# Patient Record
Sex: FEMALE | Race: WHITE | NOT HISPANIC OR LATINO | Employment: OTHER | URBAN - METROPOLITAN AREA
[De-identification: names, ages, dates, MRNs, and addresses within clinical notes are randomized per-mention and may not be internally consistent; named-entity substitution may affect disease eponyms.]

---

## 2017-01-09 ENCOUNTER — AMBULATORY - HEALTHEAST (OUTPATIENT)
Dept: ENDOCRINOLOGY | Facility: CLINIC | Age: 67
End: 2017-01-09

## 2017-01-09 DIAGNOSIS — M81.0 OSTEOPOROSIS: ICD-10-CM

## 2017-03-02 ENCOUNTER — COMMUNICATION - HEALTHEAST (OUTPATIENT)
Dept: INTERNAL MEDICINE | Facility: CLINIC | Age: 67
End: 2017-03-02

## 2017-03-02 DIAGNOSIS — I10 ESSENTIAL HYPERTENSION WITH GOAL BLOOD PRESSURE LESS THAN 140/90: ICD-10-CM

## 2017-03-02 DIAGNOSIS — E78.5 HYPERLIPIDEMIA: ICD-10-CM

## 2017-07-11 ENCOUNTER — AMBULATORY - HEALTHEAST (OUTPATIENT)
Dept: ENDOCRINOLOGY | Facility: CLINIC | Age: 67
End: 2017-07-11

## 2017-07-11 DIAGNOSIS — M81.0 OSTEOPOROSIS: ICD-10-CM

## 2017-09-27 ENCOUNTER — COMMUNICATION - HEALTHEAST (OUTPATIENT)
Dept: INTERNAL MEDICINE | Facility: CLINIC | Age: 67
End: 2017-09-27

## 2017-09-27 DIAGNOSIS — I10 ESSENTIAL HYPERTENSION WITH GOAL BLOOD PRESSURE LESS THAN 140/90: ICD-10-CM

## 2017-09-28 ENCOUNTER — OFFICE VISIT - HEALTHEAST (OUTPATIENT)
Dept: INTERNAL MEDICINE | Facility: CLINIC | Age: 67
End: 2017-09-28

## 2017-09-28 DIAGNOSIS — E78.5 HYPERLIPIDEMIA: ICD-10-CM

## 2017-09-28 DIAGNOSIS — M53.3 SI (SACROILIAC) PAIN: ICD-10-CM

## 2017-09-28 DIAGNOSIS — M25.511 RIGHT SHOULDER PAIN: ICD-10-CM

## 2017-09-28 DIAGNOSIS — I10 ESSENTIAL HYPERTENSION WITH GOAL BLOOD PRESSURE LESS THAN 140/90: ICD-10-CM

## 2017-09-28 ASSESSMENT — MIFFLIN-ST. JEOR: SCORE: 1008.5

## 2017-10-03 ENCOUNTER — OFFICE VISIT - HEALTHEAST (OUTPATIENT)
Dept: PHYSICAL THERAPY | Facility: REHABILITATION | Age: 67
End: 2017-10-03

## 2017-10-03 DIAGNOSIS — M54.2 CERVICALGIA: ICD-10-CM

## 2017-10-03 DIAGNOSIS — M62.81 MUSCLE WEAKNESS (GENERALIZED): ICD-10-CM

## 2017-10-03 DIAGNOSIS — R29.3 POOR POSTURE: ICD-10-CM

## 2017-10-03 DIAGNOSIS — M54.50 ACUTE RIGHT-SIDED LOW BACK PAIN WITHOUT SCIATICA: ICD-10-CM

## 2017-10-05 ENCOUNTER — OFFICE VISIT - HEALTHEAST (OUTPATIENT)
Dept: PHYSICAL THERAPY | Facility: REHABILITATION | Age: 67
End: 2017-10-05

## 2017-10-05 DIAGNOSIS — R29.3 POOR POSTURE: ICD-10-CM

## 2017-10-05 DIAGNOSIS — M62.81 MUSCLE WEAKNESS (GENERALIZED): ICD-10-CM

## 2017-10-05 DIAGNOSIS — M54.2 CERVICALGIA: ICD-10-CM

## 2017-10-05 DIAGNOSIS — M54.50 ACUTE RIGHT-SIDED LOW BACK PAIN WITHOUT SCIATICA: ICD-10-CM

## 2017-10-10 ENCOUNTER — OFFICE VISIT - HEALTHEAST (OUTPATIENT)
Dept: INTERNAL MEDICINE | Facility: CLINIC | Age: 67
End: 2017-10-10

## 2017-10-10 DIAGNOSIS — I10 ESSENTIAL HYPERTENSION: ICD-10-CM

## 2017-10-10 DIAGNOSIS — Z00.00 HEALTH CARE MAINTENANCE: ICD-10-CM

## 2017-10-10 DIAGNOSIS — M81.0 OSTEOPOROSIS: ICD-10-CM

## 2017-10-10 DIAGNOSIS — M54.2 NECK PAIN: ICD-10-CM

## 2017-10-10 DIAGNOSIS — E78.00 HYPERCHOLESTEREMIA: ICD-10-CM

## 2017-10-10 LAB
CHOLEST SERPL-MCNC: 186 MG/DL
FASTING STATUS PATIENT QL REPORTED: YES
HDLC SERPL-MCNC: 64 MG/DL
LDLC SERPL CALC-MCNC: 76 MG/DL
TRIGL SERPL-MCNC: 231 MG/DL

## 2017-10-11 ENCOUNTER — COMMUNICATION - HEALTHEAST (OUTPATIENT)
Dept: INTERNAL MEDICINE | Facility: CLINIC | Age: 67
End: 2017-10-11

## 2017-11-07 ENCOUNTER — HOSPITAL ENCOUNTER (OUTPATIENT)
Dept: MAMMOGRAPHY | Facility: HOSPITAL | Age: 67
Discharge: HOME | End: 2017-11-07
Attending: INTERNAL MEDICINE

## 2017-11-07 DIAGNOSIS — Z12.31 VISIT FOR SCREENING MAMMOGRAM: ICD-10-CM

## 2017-11-28 ENCOUNTER — COMMUNICATION - HEALTHEAST (OUTPATIENT)
Dept: INTERNAL MEDICINE | Facility: CLINIC | Age: 67
End: 2017-11-28

## 2017-12-05 ENCOUNTER — COMMUNICATION - HEALTHEAST (OUTPATIENT)
Dept: INTERNAL MEDICINE | Facility: CLINIC | Age: 67
End: 2017-12-05

## 2018-01-16 ENCOUNTER — AMBULATORY - HEALTHEAST (OUTPATIENT)
Dept: ENDOCRINOLOGY | Facility: CLINIC | Age: 68
End: 2018-01-16

## 2018-01-16 DIAGNOSIS — M81.0 OSTEOPOROSIS: ICD-10-CM

## 2018-07-20 ENCOUNTER — AMBULATORY - HEALTHEAST (OUTPATIENT)
Dept: ENDOCRINOLOGY | Facility: CLINIC | Age: 68
End: 2018-07-20

## 2018-09-12 ENCOUNTER — COMMUNICATION - HEALTHEAST (OUTPATIENT)
Dept: INTERNAL MEDICINE | Facility: CLINIC | Age: 68
End: 2018-09-12

## 2018-09-12 DIAGNOSIS — I10 ESSENTIAL HYPERTENSION WITH GOAL BLOOD PRESSURE LESS THAN 140/90: ICD-10-CM

## 2018-10-17 ENCOUNTER — AMBULATORY - HEALTHEAST (OUTPATIENT)
Dept: LAB | Facility: CLINIC | Age: 68
End: 2018-10-17

## 2018-10-17 DIAGNOSIS — Z12.11 COLON CANCER SCREENING: ICD-10-CM

## 2018-10-17 LAB
HEMOCCULT SP1 STL QL: NEGATIVE
HEMOCCULT SP2 STL QL: NEGATIVE
HEMOCCULT SP3 STL QL: NEGATIVE

## 2018-10-18 ENCOUNTER — COMMUNICATION - HEALTHEAST (OUTPATIENT)
Dept: INTERNAL MEDICINE | Facility: CLINIC | Age: 68
End: 2018-10-18

## 2018-10-23 ENCOUNTER — RECORDS - HEALTHEAST (OUTPATIENT)
Dept: ADMINISTRATIVE | Facility: OTHER | Age: 68
End: 2018-10-23

## 2018-10-29 ENCOUNTER — OFFICE VISIT - HEALTHEAST (OUTPATIENT)
Dept: FAMILY MEDICINE | Facility: CLINIC | Age: 68
End: 2018-10-29

## 2018-10-29 ENCOUNTER — COMMUNICATION - HEALTHEAST (OUTPATIENT)
Dept: FAMILY MEDICINE | Facility: CLINIC | Age: 68
End: 2018-10-29

## 2018-10-29 DIAGNOSIS — I10 ESSENTIAL HYPERTENSION: ICD-10-CM

## 2018-10-29 DIAGNOSIS — M81.0 OSTEOPOROSIS WITHOUT CURRENT PATHOLOGICAL FRACTURE, UNSPECIFIED OSTEOPOROSIS TYPE: ICD-10-CM

## 2018-10-29 DIAGNOSIS — Z00.00 ROUTINE GENERAL MEDICAL EXAMINATION AT A HEALTH CARE FACILITY: ICD-10-CM

## 2018-10-29 DIAGNOSIS — R73.01 IMPAIRED FASTING GLUCOSE: ICD-10-CM

## 2018-10-29 DIAGNOSIS — J30.89 NON-SEASONAL ALLERGIC RHINITIS, UNSPECIFIED TRIGGER: ICD-10-CM

## 2018-10-29 DIAGNOSIS — F43.22 ADJUSTMENT DISORDER WITH ANXIOUS MOOD: ICD-10-CM

## 2018-10-29 DIAGNOSIS — E78.00 HYPERCHOLESTEREMIA: ICD-10-CM

## 2018-10-29 LAB
ANION GAP SERPL CALCULATED.3IONS-SCNC: 13 MMOL/L (ref 5–18)
BUN SERPL-MCNC: 13 MG/DL (ref 8–22)
CALCIUM SERPL-MCNC: 9.7 MG/DL (ref 8.5–10.5)
CHLORIDE BLD-SCNC: 104 MMOL/L (ref 98–107)
CHOLEST SERPL-MCNC: 206 MG/DL
CO2 SERPL-SCNC: 22 MMOL/L (ref 22–31)
CREAT SERPL-MCNC: 0.78 MG/DL (ref 0.6–1.1)
ERYTHROCYTE [DISTWIDTH] IN BLOOD BY AUTOMATED COUNT: 9.8 % (ref 11–14.5)
FASTING STATUS PATIENT QL REPORTED: YES
GFR SERPL CREATININE-BSD FRML MDRD: >60 ML/MIN/1.73M2
GLUCOSE BLD-MCNC: 108 MG/DL (ref 70–125)
HCT VFR BLD AUTO: 37.5 % (ref 35–47)
HDLC SERPL-MCNC: 72 MG/DL
HGB BLD-MCNC: 12.2 G/DL (ref 12–16)
LDLC SERPL CALC-MCNC: 105 MG/DL
MCH RBC QN AUTO: 34.3 PG (ref 27–34)
MCHC RBC AUTO-ENTMCNC: 32.5 G/DL (ref 32–36)
MCV RBC AUTO: 105 FL (ref 80–100)
PLATELET # BLD AUTO: 200 THOU/UL (ref 140–440)
PMV BLD AUTO: 7.9 FL (ref 7–10)
POTASSIUM BLD-SCNC: 4 MMOL/L (ref 3.5–5)
RBC # BLD AUTO: 3.56 MILL/UL (ref 3.8–5.4)
SODIUM SERPL-SCNC: 139 MMOL/L (ref 136–145)
TRIGL SERPL-MCNC: 145 MG/DL
WBC: 8 THOU/UL (ref 4–11)

## 2018-10-29 ASSESSMENT — MIFFLIN-ST. JEOR: SCORE: 979.02

## 2018-12-04 ENCOUNTER — AMBULATORY - HEALTHEAST (OUTPATIENT)
Dept: NURSING | Facility: CLINIC | Age: 68
End: 2018-12-04

## 2018-12-05 ENCOUNTER — RECORDS - HEALTHEAST (OUTPATIENT)
Dept: BONE DENSITY | Facility: CLINIC | Age: 68
End: 2018-12-05

## 2018-12-05 ENCOUNTER — HOSPITAL ENCOUNTER (OUTPATIENT)
Dept: MAMMOGRAPHY | Facility: CLINIC | Age: 68
Discharge: HOME | End: 2018-12-05
Attending: INTERNAL MEDICINE

## 2018-12-05 DIAGNOSIS — M81.0 AGE-RELATED OSTEOPOROSIS WITHOUT CURRENT PATHOLOGICAL FRACTURE: ICD-10-CM

## 2018-12-05 DIAGNOSIS — Z12.31 VISIT FOR SCREENING MAMMOGRAM: ICD-10-CM

## 2018-12-06 ENCOUNTER — COMMUNICATION - HEALTHEAST (OUTPATIENT)
Dept: FAMILY MEDICINE | Facility: CLINIC | Age: 68
End: 2018-12-06

## 2018-12-06 ENCOUNTER — RECORDS - HEALTHEAST (OUTPATIENT)
Dept: ADMINISTRATIVE | Facility: OTHER | Age: 68
End: 2018-12-06

## 2018-12-19 ENCOUNTER — COMMUNICATION - HEALTHEAST (OUTPATIENT)
Dept: FAMILY MEDICINE | Facility: CLINIC | Age: 68
End: 2018-12-19

## 2018-12-19 DIAGNOSIS — M81.0 OSTEOPOROSIS WITHOUT CURRENT PATHOLOGICAL FRACTURE, UNSPECIFIED OSTEOPOROSIS TYPE: ICD-10-CM

## 2019-01-03 ENCOUNTER — COMMUNICATION - HEALTHEAST (OUTPATIENT)
Dept: INTERNAL MEDICINE | Facility: CLINIC | Age: 69
End: 2019-01-03

## 2019-01-10 ENCOUNTER — COMMUNICATION - HEALTHEAST (OUTPATIENT)
Dept: INTERNAL MEDICINE | Facility: CLINIC | Age: 69
End: 2019-01-10

## 2019-01-10 DIAGNOSIS — I10 ESSENTIAL HYPERTENSION WITH GOAL BLOOD PRESSURE LESS THAN 140/90: ICD-10-CM

## 2019-01-14 ENCOUNTER — COMMUNICATION - HEALTHEAST (OUTPATIENT)
Dept: INTERNAL MEDICINE | Facility: CLINIC | Age: 69
End: 2019-01-14

## 2019-01-14 DIAGNOSIS — I10 ESSENTIAL HYPERTENSION WITH GOAL BLOOD PRESSURE LESS THAN 140/90: ICD-10-CM

## 2019-01-17 ENCOUNTER — OFFICE VISIT - HEALTHEAST (OUTPATIENT)
Dept: INTERNAL MEDICINE | Facility: CLINIC | Age: 69
End: 2019-01-17

## 2019-01-17 DIAGNOSIS — M81.0 OSTEOPOROSIS WITHOUT CURRENT PATHOLOGICAL FRACTURE, UNSPECIFIED OSTEOPOROSIS TYPE: ICD-10-CM

## 2019-01-17 ASSESSMENT — MIFFLIN-ST. JEOR: SCORE: 995.18

## 2019-07-10 ENCOUNTER — RECORDS - HEALTHEAST (OUTPATIENT)
Dept: ADMINISTRATIVE | Facility: OTHER | Age: 69
End: 2019-07-10

## 2019-07-17 ENCOUNTER — COMMUNICATION - HEALTHEAST (OUTPATIENT)
Dept: INTERNAL MEDICINE | Facility: CLINIC | Age: 69
End: 2019-07-17

## 2019-07-18 ENCOUNTER — AMBULATORY - HEALTHEAST (OUTPATIENT)
Dept: NURSING | Facility: CLINIC | Age: 69
End: 2019-07-18

## 2019-12-12 ENCOUNTER — COMMUNICATION - HEALTHEAST (OUTPATIENT)
Dept: INTERNAL MEDICINE | Facility: CLINIC | Age: 69
End: 2019-12-12

## 2019-12-12 ENCOUNTER — COMMUNICATION - HEALTHEAST (OUTPATIENT)
Dept: SCHEDULING | Facility: CLINIC | Age: 69
End: 2019-12-12

## 2019-12-12 DIAGNOSIS — I10 ESSENTIAL HYPERTENSION WITH GOAL BLOOD PRESSURE LESS THAN 140/90: ICD-10-CM

## 2020-01-21 ENCOUNTER — OFFICE VISIT - HEALTHEAST (OUTPATIENT)
Dept: FAMILY MEDICINE | Facility: CLINIC | Age: 70
End: 2020-01-21

## 2020-01-21 DIAGNOSIS — Z00.00 ROUTINE GENERAL MEDICAL EXAMINATION AT A HEALTH CARE FACILITY: ICD-10-CM

## 2020-01-21 DIAGNOSIS — I10 ESSENTIAL HYPERTENSION: ICD-10-CM

## 2020-01-21 DIAGNOSIS — R73.01 IMPAIRED FASTING GLUCOSE: ICD-10-CM

## 2020-01-21 DIAGNOSIS — F43.22 ADJUSTMENT DISORDER WITH ANXIOUS MOOD: ICD-10-CM

## 2020-01-21 DIAGNOSIS — E78.00 HYPERCHOLESTEREMIA: ICD-10-CM

## 2020-01-21 DIAGNOSIS — Z12.11 COLON CANCER SCREENING: ICD-10-CM

## 2020-01-21 DIAGNOSIS — M81.0 OSTEOPOROSIS WITHOUT CURRENT PATHOLOGICAL FRACTURE, UNSPECIFIED OSTEOPOROSIS TYPE: ICD-10-CM

## 2020-01-21 LAB
ALBUMIN SERPL-MCNC: 4.1 G/DL (ref 3.5–5)
ALP SERPL-CCNC: 108 U/L (ref 45–120)
ALT SERPL W P-5'-P-CCNC: 35 U/L (ref 0–45)
ANION GAP SERPL CALCULATED.3IONS-SCNC: 10 MMOL/L (ref 5–18)
AST SERPL W P-5'-P-CCNC: 34 U/L (ref 0–40)
BILIRUB SERPL-MCNC: 0.8 MG/DL (ref 0–1)
BUN SERPL-MCNC: 13 MG/DL (ref 8–22)
CALCIUM SERPL-MCNC: 10 MG/DL (ref 8.5–10.5)
CHLORIDE BLD-SCNC: 101 MMOL/L (ref 98–107)
CHOLEST SERPL-MCNC: 210 MG/DL
CO2 SERPL-SCNC: 27 MMOL/L (ref 22–31)
CREAT SERPL-MCNC: 0.87 MG/DL (ref 0.6–1.1)
ERYTHROCYTE [DISTWIDTH] IN BLOOD BY AUTOMATED COUNT: 10.1 % (ref 11–14.5)
FASTING STATUS PATIENT QL REPORTED: YES
GFR SERPL CREATININE-BSD FRML MDRD: >60 ML/MIN/1.73M2
GLUCOSE BLD-MCNC: 139 MG/DL (ref 70–125)
HCT VFR BLD AUTO: 38.3 % (ref 35–47)
HDLC SERPL-MCNC: 85 MG/DL
HGB BLD-MCNC: 12.4 G/DL (ref 12–16)
LDLC SERPL CALC-MCNC: 91 MG/DL
MCH RBC QN AUTO: 34.1 PG (ref 27–34)
MCHC RBC AUTO-ENTMCNC: 32.3 G/DL (ref 32–36)
MCV RBC AUTO: 106 FL (ref 80–100)
PLATELET # BLD AUTO: 215 THOU/UL (ref 140–440)
PMV BLD AUTO: 7.8 FL (ref 7–10)
POTASSIUM BLD-SCNC: 4.4 MMOL/L (ref 3.5–5)
PROT SERPL-MCNC: 7.6 G/DL (ref 6–8)
RBC # BLD AUTO: 3.62 MILL/UL (ref 3.8–5.4)
SODIUM SERPL-SCNC: 138 MMOL/L (ref 136–145)
TRIGL SERPL-MCNC: 169 MG/DL
WBC: 6.7 THOU/UL (ref 4–11)

## 2020-01-21 ASSESSMENT — MIFFLIN-ST. JEOR: SCORE: 970.4

## 2020-01-22 ENCOUNTER — COMMUNICATION - HEALTHEAST (OUTPATIENT)
Dept: FAMILY MEDICINE | Facility: CLINIC | Age: 70
End: 2020-01-22

## 2020-01-22 LAB — HBA1C MFR BLD: 5.7 % (ref 3.5–6)

## 2020-01-23 ENCOUNTER — COMMUNICATION - HEALTHEAST (OUTPATIENT)
Dept: INTERNAL MEDICINE | Facility: CLINIC | Age: 70
End: 2020-01-23

## 2020-01-28 ENCOUNTER — AMBULATORY - HEALTHEAST (OUTPATIENT)
Dept: NURSING | Facility: CLINIC | Age: 70
End: 2020-01-28

## 2020-02-03 ENCOUNTER — RECORDS - HEALTHEAST (OUTPATIENT)
Dept: ADMINISTRATIVE | Facility: OTHER | Age: 70
End: 2020-02-03

## 2020-02-03 LAB — COLOGUARD-ABSTRACT: NEGATIVE

## 2020-02-07 ENCOUNTER — COMMUNICATION - HEALTHEAST (OUTPATIENT)
Dept: FAMILY MEDICINE | Facility: CLINIC | Age: 70
End: 2020-02-07

## 2020-02-07 DIAGNOSIS — E78.00 HYPERCHOLESTEREMIA: ICD-10-CM

## 2020-02-21 ENCOUNTER — RECORDS - HEALTHEAST (OUTPATIENT)
Dept: HEALTH INFORMATION MANAGEMENT | Facility: CLINIC | Age: 70
End: 2020-02-21

## 2020-03-03 ENCOUNTER — HOSPITAL ENCOUNTER (OUTPATIENT)
Dept: MAMMOGRAPHY | Facility: CLINIC | Age: 70
Discharge: HOME | End: 2020-03-03
Attending: FAMILY MEDICINE

## 2020-03-03 DIAGNOSIS — Z12.31 VISIT FOR SCREENING MAMMOGRAM: ICD-10-CM

## 2020-03-09 ENCOUNTER — HOSPITAL ENCOUNTER (OUTPATIENT)
Dept: MAMMOGRAPHY | Facility: CLINIC | Age: 70
Discharge: HOME | End: 2020-03-09
Attending: FAMILY MEDICINE

## 2020-03-09 DIAGNOSIS — N64.89 BREAST ASYMMETRY: ICD-10-CM

## 2020-05-01 ENCOUNTER — COMMUNICATION - HEALTHEAST (OUTPATIENT)
Dept: FAMILY MEDICINE | Facility: CLINIC | Age: 70
End: 2020-05-01

## 2020-06-18 ENCOUNTER — COMMUNICATION - HEALTHEAST (OUTPATIENT)
Dept: INTERNAL MEDICINE | Facility: CLINIC | Age: 70
End: 2020-06-18

## 2020-11-23 ENCOUNTER — COMMUNICATION - HEALTHEAST (OUTPATIENT)
Dept: FAMILY MEDICINE | Facility: CLINIC | Age: 70
End: 2020-11-23

## 2020-11-23 DIAGNOSIS — I10 ESSENTIAL HYPERTENSION WITH GOAL BLOOD PRESSURE LESS THAN 140/90: ICD-10-CM

## 2021-02-17 ENCOUNTER — COMMUNICATION - HEALTHEAST (OUTPATIENT)
Dept: FAMILY MEDICINE | Facility: CLINIC | Age: 71
End: 2021-02-17

## 2021-02-17 DIAGNOSIS — I10 ESSENTIAL HYPERTENSION WITH GOAL BLOOD PRESSURE LESS THAN 140/90: ICD-10-CM

## 2021-02-17 DIAGNOSIS — E78.00 HYPERCHOLESTEREMIA: ICD-10-CM

## 2021-03-03 ENCOUNTER — RECORDS - HEALTHEAST (OUTPATIENT)
Dept: ADMINISTRATIVE | Facility: OTHER | Age: 71
End: 2021-03-03

## 2021-03-31 ENCOUNTER — OFFICE VISIT - HEALTHEAST (OUTPATIENT)
Dept: FAMILY MEDICINE | Facility: CLINIC | Age: 71
End: 2021-03-31

## 2021-03-31 ENCOUNTER — COMMUNICATION - HEALTHEAST (OUTPATIENT)
Dept: FAMILY MEDICINE | Facility: CLINIC | Age: 71
End: 2021-03-31

## 2021-03-31 DIAGNOSIS — M81.0 OSTEOPOROSIS WITHOUT CURRENT PATHOLOGICAL FRACTURE, UNSPECIFIED OSTEOPOROSIS TYPE: ICD-10-CM

## 2021-03-31 DIAGNOSIS — R73.01 IMPAIRED FASTING GLUCOSE: ICD-10-CM

## 2021-03-31 DIAGNOSIS — F10.10 ALCOHOL USE DISORDER, MILD, ABUSE: ICD-10-CM

## 2021-03-31 DIAGNOSIS — I10 ESSENTIAL HYPERTENSION: ICD-10-CM

## 2021-03-31 DIAGNOSIS — E78.00 HYPERCHOLESTEREMIA: ICD-10-CM

## 2021-03-31 DIAGNOSIS — Z00.00 ROUTINE GENERAL MEDICAL EXAMINATION AT A HEALTH CARE FACILITY: ICD-10-CM

## 2021-03-31 DIAGNOSIS — M25.551 HIP PAIN, RIGHT: ICD-10-CM

## 2021-03-31 LAB
ALBUMIN SERPL-MCNC: 4.3 G/DL (ref 3.5–5)
ALP SERPL-CCNC: 123 U/L (ref 45–120)
ALT SERPL W P-5'-P-CCNC: 41 U/L (ref 0–45)
ANION GAP SERPL CALCULATED.3IONS-SCNC: 12 MMOL/L (ref 5–18)
AST SERPL W P-5'-P-CCNC: 41 U/L (ref 0–40)
BILIRUB SERPL-MCNC: 0.6 MG/DL (ref 0–1)
BUN SERPL-MCNC: 14 MG/DL (ref 8–28)
CALCIUM SERPL-MCNC: 10 MG/DL (ref 8.5–10.5)
CHLORIDE BLD-SCNC: 102 MMOL/L (ref 98–107)
CHOLEST SERPL-MCNC: 206 MG/DL
CO2 SERPL-SCNC: 25 MMOL/L (ref 22–31)
CREAT SERPL-MCNC: 0.85 MG/DL (ref 0.6–1.1)
ERYTHROCYTE [DISTWIDTH] IN BLOOD BY AUTOMATED COUNT: 12.1 % (ref 11–14.5)
FASTING STATUS PATIENT QL REPORTED: NO
GFR SERPL CREATININE-BSD FRML MDRD: >60 ML/MIN/1.73M2
GLUCOSE BLD-MCNC: 126 MG/DL (ref 70–125)
HBA1C MFR BLD: 5.5 %
HCT VFR BLD AUTO: 37.4 % (ref 35–47)
HDLC SERPL-MCNC: 79 MG/DL
HGB BLD-MCNC: 12.5 G/DL (ref 12–16)
LDLC SERPL CALC-MCNC: 91 MG/DL
MCH RBC QN AUTO: 34.5 PG (ref 27–34)
MCHC RBC AUTO-ENTMCNC: 33.4 G/DL (ref 32–36)
MCV RBC AUTO: 103 FL (ref 80–100)
PLATELET # BLD AUTO: 219 THOU/UL (ref 140–440)
PMV BLD AUTO: 9.9 FL (ref 7–10)
POTASSIUM BLD-SCNC: 4.3 MMOL/L (ref 3.5–5)
PROT SERPL-MCNC: 7.8 G/DL (ref 6–8)
RBC # BLD AUTO: 3.62 MILL/UL (ref 3.8–5.4)
SODIUM SERPL-SCNC: 139 MMOL/L (ref 136–145)
TRIGL SERPL-MCNC: 180 MG/DL
WBC: 7.5 THOU/UL (ref 4–11)

## 2021-03-31 ASSESSMENT — MIFFLIN-ST. JEOR: SCORE: 990.81

## 2021-04-01 ENCOUNTER — HOSPITAL ENCOUNTER (OUTPATIENT)
Dept: MAMMOGRAPHY | Facility: CLINIC | Age: 71
Discharge: HOME | End: 2021-04-01
Attending: FAMILY MEDICINE

## 2021-04-01 DIAGNOSIS — Z12.31 VISIT FOR SCREENING MAMMOGRAM: ICD-10-CM

## 2021-04-26 ENCOUNTER — COMMUNICATION - HEALTHEAST (OUTPATIENT)
Dept: FAMILY MEDICINE | Facility: CLINIC | Age: 71
End: 2021-04-26

## 2021-04-26 DIAGNOSIS — E78.00 HYPERCHOLESTEREMIA: ICD-10-CM

## 2021-04-26 DIAGNOSIS — I10 ESSENTIAL HYPERTENSION WITH GOAL BLOOD PRESSURE LESS THAN 140/90: ICD-10-CM

## 2021-05-11 ENCOUNTER — COMMUNICATION - HEALTHEAST (OUTPATIENT)
Dept: FAMILY MEDICINE | Facility: CLINIC | Age: 71
End: 2021-05-11

## 2021-05-12 ENCOUNTER — OFFICE VISIT - HEALTHEAST (OUTPATIENT)
Dept: FAMILY MEDICINE | Facility: CLINIC | Age: 71
End: 2021-05-12

## 2021-05-12 DIAGNOSIS — F41.9 EPISODE OF ANXIETY: ICD-10-CM

## 2021-05-12 ASSESSMENT — ANXIETY QUESTIONNAIRES
IF YOU CHECKED OFF ANY PROBLEMS ON THIS QUESTIONNAIRE, HOW DIFFICULT HAVE THESE PROBLEMS MADE IT FOR YOU TO DO YOUR WORK, TAKE CARE OF THINGS AT HOME, OR GET ALONG WITH OTHER PEOPLE: NOT DIFFICULT AT ALL
1. FEELING NERVOUS, ANXIOUS, OR ON EDGE: SEVERAL DAYS
7. FEELING AFRAID AS IF SOMETHING AWFUL MIGHT HAPPEN: NOT AT ALL
GAD7 TOTAL SCORE: 3
6. BECOMING EASILY ANNOYED OR IRRITABLE: NOT AT ALL
2. NOT BEING ABLE TO STOP OR CONTROL WORRYING: SEVERAL DAYS
5. BEING SO RESTLESS THAT IT IS HARD TO SIT STILL: NOT AT ALL
3. WORRYING TOO MUCH ABOUT DIFFERENT THINGS: SEVERAL DAYS
4. TROUBLE RELAXING: NOT AT ALL

## 2021-05-28 ASSESSMENT — ANXIETY QUESTIONNAIRES: GAD7 TOTAL SCORE: 3

## 2021-05-30 NOTE — TELEPHONE ENCOUNTER
"Patient Returning Call  Reason for call:  Patient is returning Isadora's call.  Information relayed to patient:  Below message and Prolia questions.  Patient has additional questions:  No  If YES, what are your questions/concerns:  N/A  Okay to leave a detailed message?: No call back needed     Prolia Injection Phone Screen    Screening questions have been asked 2-3 days prior to administration visit for Prolia. If any questions are answered with \"Yes,\" this phone encounter were will routed to ordering provider for further evaluation.      1.  When was the last injection?  1/17/19     2.  Has insurance for this injection been verified?  Yes     3.  Did you experience any new onset achiness or rashes that lasted for over a month with your previous Prolia injection? No      4.  Do you have a fever over 101?F or a new deep cough that is unusual for you today? No     5.  Have you started any new medications in the last 6 months that you were told could affect your immune system? These may have been prescribed by oncologist, transplant, rheumatology, or dermatology.  No     6.  In the last 6 months have you have gastric bypass or parathyroid surgery? No     7.  Do you plan dental work requiring drilling into the bone such as implants/extractions or oral surgery in the next 2-3 months?  Not right now      8. Do you have new insurance since the last injection? No     Patient informed if symptoms discussed above present prior to their administration appointment, they are to notify clinic immediately.   "

## 2021-05-30 NOTE — TELEPHONE ENCOUNTER
"LMTCB Please answer questions with the pt   If unable to answer the questions please transfer the pt the Vocera/back line     Prolia Injection Phone Screen      Screening questions have been asked 2-3 days prior to administration visit for Prolia. If any questions are answered with \"Yes,\" this phone encounter were will routed to ordering provider for further evaluation.     1.  When was the last injection?  1/17/19    2.  Has insurance for this injection been verified?  Yes    3.  Did you experience any new onset achiness or rashes that lasted for over a month with your previous Prolia injection?       4.  Do you have a fever over 101?F or a new deep cough that is unusual for you today?    5.  Have you started any new medications in the last 6 months that you were told could affect your immune system? These may have been prescribed by oncologist, transplant, rheumatology, or dermatology.       6.  In the last 6 months have you have gastric bypass or parathyroid surgery?       7.  Do you plan dental work requiring drilling into the bone such as implants/extractions or oral surgery in the next 2-3 months?       8. Do you have new insurance since the last injection?    Patient informed if symptoms discussed above present prior to their administration appointment, they are to notify clinic immediately.     Isadora Loo              "

## 2021-05-31 ENCOUNTER — RECORDS - HEALTHEAST (OUTPATIENT)
Dept: ADMINISTRATIVE | Facility: CLINIC | Age: 71
End: 2021-05-31

## 2021-05-31 VITALS — WEIGHT: 132 LBS | HEIGHT: 58 IN | BODY MASS INDEX: 27.71 KG/M2

## 2021-05-31 VITALS — WEIGHT: 132 LBS | BODY MASS INDEX: 27.59 KG/M2

## 2021-06-02 ENCOUNTER — RECORDS - HEALTHEAST (OUTPATIENT)
Dept: ADMINISTRATIVE | Facility: CLINIC | Age: 71
End: 2021-06-02

## 2021-06-02 VITALS — BODY MASS INDEX: 28.6 KG/M2 | WEIGHT: 132.56 LBS | HEIGHT: 57 IN

## 2021-06-02 VITALS — HEIGHT: 57 IN | WEIGHT: 130.75 LBS | BODY MASS INDEX: 28.21 KG/M2

## 2021-06-04 VITALS
RESPIRATION RATE: 20 BRPM | SYSTOLIC BLOOD PRESSURE: 130 MMHG | HEART RATE: 72 BPM | WEIGHT: 127.1 LBS | BODY MASS INDEX: 27.42 KG/M2 | DIASTOLIC BLOOD PRESSURE: 78 MMHG | HEIGHT: 57 IN

## 2021-06-04 NOTE — TELEPHONE ENCOUNTER
RN cannot approve Refill Request    RN can NOT refill this medication PCP messaged that patient is overdue for Labs and NO PCP. Last office visit: 1/17/2019 Marielos Jean MD Last Physical: Visit date not found Last MTM visit: Visit date not found Last visit same specialty: 1/17/2019 Marielos Jean MD.  Next visit within 3 mo: Visit date not found  Next physical within 3 mo: Visit date not found      Mariola Niño, [facility] Triage/Med Refill 12/13/2019    Requested Prescriptions   Pending Prescriptions Disp Refills     metoprolol succinate (TOPROL-XL) 50 MG 24 hr tablet 180 tablet 3     Sig: Take 1 tablet (50 mg total) by mouth 2 (two) times a day.       Beta-Blockers Refill Protocol Passed - 12/12/2019 10:43 AM        Passed - PCP or prescribing provider visit in past 12 months or next 3 months     Last office visit with prescriber/PCP: 1/17/2019 Marielos Jean MD OR same dept: 1/17/2019 Marielos Jean MD OR same specialty: 1/17/2019 Marielos Jean MD  Last physical: Visit date not found Last MTM visit: Visit date not found   Next visit within 3 mo: Visit date not found  Next physical within 3 mo: Visit date not found  Prescriber OR PCP: Marielos Jean MD  Last diagnosis associated with med order: 1. Essential hypertension with goal blood pressure less than 140/90  - metoprolol succinate (TOPROL-XL) 50 MG 24 hr tablet; Take 1 tablet (50 mg total) by mouth 2 (two) times a day.  Dispense: 180 tablet; Refill: 3  - losartan (COZAAR) 25 MG tablet; Take 1 tablet (25 mg total) by mouth daily.  Dispense: 90 tablet; Refill: 3    If protocol passes may refill for 12 months if within 3 months of last provider visit (or a total of 15 months).             Passed - Blood pressure filed in past 12 months     BP Readings from Last 1 Encounters:   01/17/19 134/64             losartan (COZAAR) 25 MG tablet 90 tablet 3     Sig: Take 1 tablet (25 mg total) by mouth daily.       Angiotensin Receptor  Blocker Protocol Failed - 12/12/2019 10:43 AM        Failed - Serum potassium within the past 12 months     No results found for: LN-POTASSIUM          Failed - Serum creatinine within the past 12 months     Creatinine   Date Value Ref Range Status   10/29/2018 0.78 0.60 - 1.10 mg/dL Final             Passed - PCP or prescribing provider visit in past 12 months       Last office visit with prescriber/PCP: 1/17/2019 Marielos Jean MD OR same dept: 1/17/2019 Marielos Jean MD OR same specialty: 1/17/2019 Marielos Jean MD  Last physical: Visit date not found Last MTM visit: Visit date not found   Next visit within 3 mo: Visit date not found  Next physical within 3 mo: Visit date not found  Prescriber OR PCP: Marielos Jean MD  Last diagnosis associated with med order: 1. Essential hypertension with goal blood pressure less than 140/90  - metoprolol succinate (TOPROL-XL) 50 MG 24 hr tablet; Take 1 tablet (50 mg total) by mouth 2 (two) times a day.  Dispense: 180 tablet; Refill: 3  - losartan (COZAAR) 25 MG tablet; Take 1 tablet (25 mg total) by mouth daily.  Dispense: 90 tablet; Refill: 3    If protocol passes may refill for 12 months if within 3 months of last provider visit (or a total of 15 months).             Passed - Blood pressure filed in past 12 months     BP Readings from Last 1 Encounters:   01/17/19 134/64

## 2021-06-04 NOTE — TELEPHONE ENCOUNTER
Who is calling:  Patient  Reason for Call:    Patient has appointment with Dr Jennifer Hankins on 1/21/20 for annual Physical.  Date of last appointment with primary care: 1/17/19  Okay to leave a detailed message: Yes

## 2021-06-04 NOTE — TELEPHONE ENCOUNTER
Wrong dept linked to this encounter. Encounter closed and new encounter opened.    Nichole Duke RN   [facility] Medication Refill and Triage Nurse  10:32 AM  12/12/2019

## 2021-06-05 VITALS
OXYGEN SATURATION: 99 % | RESPIRATION RATE: 22 BRPM | SYSTOLIC BLOOD PRESSURE: 136 MMHG | HEART RATE: 74 BPM | BODY MASS INDEX: 28.39 KG/M2 | HEIGHT: 57 IN | WEIGHT: 131.6 LBS | DIASTOLIC BLOOD PRESSURE: 82 MMHG | TEMPERATURE: 98 F

## 2021-06-05 NOTE — PROGRESS NOTES
Assessment and Plan:     1. Routine general medical examination at a health care facility  Routine medicare wellness visit, updated in EMR.  Immunizations up to date with the exception of shingles, cost prohibitive for patient at present.  Mammogram up to date.  See below for further plans.  Plan repeat physical in 1 year.    2. Hypertension  Well-controlled on current regimen of losartan and metoprolol.  Labs updated as below.  - Comprehensive Metabolic Panel  - HM2(CBC w/o Differential)  - Lipid Cascade FASTING    3. Hypercholesteremia  Continues on simvastatin.  Labs updated as below.  - Comprehensive Metabolic Panel  - Lipid Cascade FASTING    4. Impaired fasting glucose  Recheck glucose performed today and meets criteria for type II diabetes.  Recommend A1c for further evaluation, will see if this can be added to today's labs.  - Comprehensive Metabolic Panel    5. Osteoporosis without current pathological fracture, unspecified osteoporosis type  Patient continues on Prolia.  DXA up to date.    6. Adjustment disorder with anxious mood  Patient relates this to the recent death of her .  She has good social support and does not wish to have any further assistance in this regard at present.    7. Colon cancer screening  After a discussion of available options, patient wishes to try Cologuard.  Order placed.  - Cologuard      The patient's current medical problems were reviewed.    I have had an Advance Directives discussion with the patient.  The following health maintenance schedule was reviewed with the patient and provided in printed form in the after visit summary:   Health Maintenance   Topic Date Due     HEPATITIS C SCREENING  1950     ZOSTER VACCINES (2 of 3) 10/05/2015     PNEUMOCOCCAL IMMUNIZATION 65+ LOW/MEDIUM RISK (2 of 2 - PPSV23) 08/17/2017     TD 18+ HE  04/16/2018     FECAL OCCULT BLOOD/FIT ANNUAL COLON CANCER SCREENING  10/17/2019     MEDICARE ANNUAL WELLNESS VISIT  10/29/2019      FALL RISK ASSESSMENT  2020     MAMMOGRAM  2020     DXA SCAN  2020     LIPID  10/29/2023     ADVANCE CARE PLANNING  10/29/2023     INFLUENZA VACCINE RULE BASED  Completed        Subjective:   Chief Complaint: June Quintero is an 69 y.o. female here for an Annual Wellness visit.     HPI:  Patient was  in November of last year and becomes tearful when talking about this.  She was not collecting any social security, and since the death of her  has had some financial stress.  She does not desire any assistance in that regard, as she says she has made arrangements.  She has otherwise been feeling well and has no specific questions or concerns today.    Review of Systems:  Please see above.  The rest of the complete review of systems are negative for all systems.    Patient Care Team:  Provider, No Primary Care as PCP - Jennifer James MD as Assigned PCP  Marielos Jean MD as Assigned PCP     Patient Active Problem List   Diagnosis     Hypercholesteremia     Hypertension     Osteoporosis     Allergic rhinitis     Impaired Fasting Glucose     Adjustment disorder with anxious mood     History reviewed. No pertinent past medical history.   History reviewed. No pertinent surgical history.   Family History   Problem Relation Age of Onset     Breast cancer Sister 60        fraternal twin     Dementia Mother          age 82     Heart disease Father          age 60- had a hole in heart     Parkinsonism Brother      Diabetes Sister      Colon cancer Neg Hx       Social History     Socioeconomic History     Marital status:      Spouse name: Not on file     Number of children: Not on file     Years of education: Not on file     Highest education level: Not on file   Occupational History     Not on file   Social Needs     Financial resource strain: Not on file     Food insecurity:     Worry: Not on file     Inability: Not on file     Transportation needs:      Medical: Not on file     Non-medical: Not on file   Tobacco Use     Smoking status: Never Smoker     Smokeless tobacco: Never Used   Substance and Sexual Activity     Alcohol use: Yes     Alcohol/week: 0.0 - 3.0 standard drinks     Drug use: Never     Sexual activity: Not Currently     Comment:  11/2019   Lifestyle     Physical activity:     Days per week: Not on file     Minutes per session: Not on file     Stress: Not on file   Relationships     Social connections:     Talks on phone: Not on file     Gets together: Not on file     Attends Caodaism service: Not on file     Active member of club or organization: Not on file     Attends meetings of clubs or organizations: Not on file     Relationship status: Not on file     Intimate partner violence:     Fear of current or ex partner: Not on file     Emotionally abused: Not on file     Physically abused: Not on file     Forced sexual activity: Not on file   Other Topics Concern     Not on file   Social History Narrative    She is . They do not have children. 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 She does not smoke cigarettes or drink alcohol.      Current Outpatient Medications   Medication Sig Dispense Refill     calcium-vitamin D (CALCIUM-VITAMIN D) 500 mg(1,250mg) -200 unit per tablet Take 2 tablets by mouth daily.       cyanocobalamin (VITAMIN B-12) 1000 MCG tablet Take 1,000 mcg by mouth daily.       famotidine (PEPCID) 20 MG tablet Take 20 mg by mouth 2 (two) times a day.       ketotifen (ZADITOR/ZYRTEC ITCHY EYES) 0.025 % ophthalmic solution 1 drop 2 (two) times a day.       losartan (COZAAR) 25 MG tablet Take 1 tablet (25 mg total) by mouth daily. 90 tablet 3     metoprolol succinate (TOPROL-XL) 50 MG 24 hr tablet Take 1 tablet (50 mg total) by mouth 2 (two) times a day. 180 tablet 3     MULTIVITAMIN ORAL Take by mouth.       simvastatin (ZOCOR) 10 MG tablet Take 1 tablet (10  "mg total) by mouth at bedtime. 90 tablet 3     timolol (BETIMOL) 0.5 % ophthalmic solution 1 drop 2 (two) times a day.       vitamin E 400 UNIT capsule Take 400 Units by mouth daily.       Current Facility-Administered Medications   Medication Dose Route Frequency Provider Last Rate Last Dose     denosumab 60 mg (PROLIA 60 mg/ml)  60 mg Subcutaneous Q6 Months Sarah Briones MD   60 mg at 07/18/19 1018      Objective:     Visit Vitals  /78 (Patient Site: Right Arm, Patient Position: Sitting, Cuff Size: Adult Regular)   Pulse 72   Resp 20   Ht 4' 9\" (1.448 m)   Wt 127 lb 1.6 oz (57.7 kg)   LMP 10/14/1994   Breastfeeding No   BMI 27.50 kg/m         VisionScreening:  No exam data present     PHYSICAL EXAM  /78 (Patient Site: Right Arm, Patient Position: Sitting, Cuff Size: Adult Regular)   Pulse 72   Resp 20   Ht 4' 9\" (1.448 m)   Wt 127 lb 1.6 oz (57.7 kg)   LMP 10/14/1994   Breastfeeding No   BMI 27.50 kg/m      General Appearance:    Alert, cooperative, no distress, appears stated age   Head:    Normocephalic, without obvious abnormality, atraumatic   Eyes:    PERRL, conjunctiva/corneas clear, EOM's intact, both eyes   Ears:    Normal TM's and external ear canals, both ears   Nose:   Nares normal, septum midline, mucosa normal, no drainage     or sinus tenderness   Throat:   Lips, mucosa, and tongue normal; teeth and gums normal   Neck:   Supple, symmetrical, trachea midline, no adenopathy;     thyroid:  no enlargement/tenderness/nodules; no carotid    bruit or JVD   Back:     Symmetric, no curvature, ROM normal, no CVA tenderness   Lungs:     Clear to auscultation bilaterally, respirations unlabored   Chest Wall:    No tenderness or deformity    Heart:    Regular rate and rhythm, S1 and S2 normal, no murmur, rub    or gallop   Breast Exam:    No tenderness, masses, or nipple abnormality   Abdomen:     Soft, non-tender, bowel sounds active all four quadrants,     no masses, no organomegaly "   Genitalia:    Not examined   Rectal:    Not examined   Extremities:   Extremities normal, atraumatic, no cyanosis or edema   Pulses:   2+ and symmetric all extremities   Skin:   Skin color, texture, turgor normal, no rashes or lesions   Lymph nodes:   Cervical, supraclavicular, and axillary nodes normal   Neurologic:   CNII-XII intact, normal strength, sensation and reflexes     throughout         Assessment Results 1/21/2020   Activities of Daily Living No help needed   Instrumental Activities of Daily Living No help needed   Mini Cog Total Score 4   Some recent data might be hidden     A Mini-Cog score of 0-2 suggests the possibility of dementia, score of 3-5 suggests no dementia    Identified Health Risks:     The patient reports that she drinks more than one alcoholic drink per day but denies binge or excessive drinking. She was counseled and given information about possible harmful effects of excessive alcohol intake.  Information regarding advance directives (living eckert), including where she can download the appropriate form, was provided to the patient via the AVS.

## 2021-06-05 NOTE — TELEPHONE ENCOUNTER
Refill Approved    Rx renewed per Medication Renewal Policy. Medication was last renewed on 10/29/18.    Ashli Clarke, [facility] Triage/Med Refill 2/8/2020     Requested Prescriptions   Pending Prescriptions Disp Refills     simvastatin (ZOCOR) 10 MG tablet [Pharmacy Med Name: SIMVASTATIN 10 MG Tablet] 90 tablet 3     Sig: TAKE 1 TABLET (10 MG TOTAL) BY MOUTH AT BEDTIME.       Statins Refill Protocol (Hmg CoA Reductase Inhibitors) Passed - 2/7/2020  2:53 PM        Passed - PCP or prescribing provider visit in past 12 months      Last office visit with prescriber/PCP: Visit date not found OR same dept: Visit date not found OR same specialty: Visit date not found  Last physical: 1/21/2020 Last MTM visit: Visit date not found   Next visit within 3 mo: Visit date not found  Next physical within 3 mo: Visit date not found  Prescriber OR PCP: Jennifer Hankins MD  Last diagnosis associated with med order: 1. Hypercholesteremia  - simvastatin (ZOCOR) 10 MG tablet [Pharmacy Med Name: SIMVASTATIN 10 MG Tablet]; Take 1 tablet (10 mg total) by mouth at bedtime.  Dispense: 90 tablet; Refill: 3    If protocol passes may refill for 12 months if within 3 months of last provider visit (or a total of 15 months).

## 2021-06-05 NOTE — TELEPHONE ENCOUNTER
"Prolia Injection Phone Screen      Screening questions have been asked 2-3 days prior to administration visit for Prolia. If any questions are answered with \"Yes,\" this phone encounter were will routed to ordering provider for further evaluation.     1.  When was the last injection?  07/18/19    2.  Has insurance for this injection been verified?  Yes    3.  Did you experience any new onset achiness or rashes that lasted for over a month with your previous Prolia injection?   No    4.  Do you have a fever over 101?F or a new deep cough that is unusual for you today? No    5.  Have you started any new medications in the last 6 months that you were told could affect your immune system? These may have been prescribed by oncologist, transplant, rheumatology, or dermatology.   No    6.  In the last 6 months have you have gastric bypass or parathyroid surgery?   No    7.  Do you plan dental work requiring drilling into the bone such as implants/extractions or oral surgery in the next 2-3 months?   No    8. Do you have new insurance since the last injection? NO    Patient informed if symptoms discussed above present prior to their administration appointment, they are to notify clinic immediately.     Kathleen Bell            "

## 2021-06-07 NOTE — TELEPHONE ENCOUNTER
Who is calling:  patient  Reason for Call:  Patient would like to do a draw to see what her blood type is at her next visit. FYI  Date of last appointment with primary care: 01/21/20  Okay to leave a detailed message: Yes

## 2021-06-09 NOTE — TELEPHONE ENCOUNTER
Please let her know that Ideally, she would have her dental extractions next month when Prolia is out of her system.  When healed, as determined by her dentist, she could then resume.  If it works for teeth extraction in July, lets bring her in in August to see me for follow up and a restart of prolia.

## 2021-06-09 NOTE — TELEPHONE ENCOUNTER
Who is calling:  Patient   Reason for Call:  Patient is questioning if there is something she should do or if she should be seen before scheduling with dental clinic to get her teeth pulled. Patient would like a call back.  Date of last appointment with primary care: n/a  Okay to leave a detailed message: Yes  [phone]

## 2021-06-09 NOTE — TELEPHONE ENCOUNTER
Left message with information below.    Spoke to patient and informed that urine culture was negative. But a lot of WBC and RBC seen suggesting another etiology? Refer to [facility].

## 2021-06-11 ENCOUNTER — COMMUNICATION - HEALTHEAST (OUTPATIENT)
Dept: FAMILY MEDICINE | Facility: CLINIC | Age: 71
End: 2021-06-11

## 2021-06-13 NOTE — PROGRESS NOTES
[facility] Discharge Summary  Patient Name: June Quintero  Date: 12/21/2017  Date of evaluation: 10/3/2017  Referral Diagnosis: SI (sacroiliac) pain, neck pain  Referring provider: Simon Jon MD    Visit Diagnosis:   1. Cervicalgia     2. Acute right-sided low back pain without sciatica     3. Muscle weakness (generalized)     4. Poor posture         Goals:  Pt. will be independent with home exercise program in : 6 weeks  Pt. will have improved quality of sleep: with less pain;waking less times/night;in 6 weeks  Pt. will bend: to dress;to clean;for dependent care;with less difficulty;for self care;for work;in 6 weeks  Patient Turn Head: for computer;for work;with less pain;with less difficulty;in 6 weeks  Patient will look up / down: for drinking;for reading;with less pain;with less difficulty;in 6 weeks  Patient will decrease : GEO score;for improved quality of function;in 6 weeks;by _ points  by ___ points: 6 points    Patient was seen for 2 visits from 10/3/17 to 10/12/17 with 1 missed appointments.  The patient discontinued therapy, did not return.  the last session was cancelled due to a family emergency and she has not followed up in clinic since that time, she will now be discharged from therapy with [facility].     Therapy will be discontinued at this time.  The patient will need a new referral to resume.    Thank you for your referral.  Aurora Pineda  12/21/2017  3:33 PM    [facility] Daily Progress     Patient Name: June Quintero  Date: 10/5/2017  Visit #: 2  PTA visit #:    Date of evaluation: 10/3/2017  Referral Diagnosis: SI (sacroiliac) pain  Referring provider: Simon Jon MD  Visit Diagnosis:     ICD-10-CM    1. Cervicalgia M54.2    2. Acute right-sided low back pain without sciatica M54.5    3. Muscle weakness (generalized) M62.81    4. Poor posture R29.3    Initial Assessment  June Quintero is a 67 y.o. female who presents to therapy today with chief  complaints of right sided neck pain and shoulder pain, as well as right sided low back and hip pain that started 3 weeks ago without a specific injury but could have been reaching overhead without use of stool and over extending herself. Patient reports the MD requested PT for low back but the right side of her neck and shoulder is more painful and bothersome overall. She presents with decreased ROM mildly in all cervical motions with either pain or tension on the right side of the low back. Patient as decreased ROM in lumbar spine with tightness in the low back on the right side. Patient refused to try lumbar rotation for fear of increase of pain. Patient will benefit from skilled PT intervention to increase ROM, decrease pain and regain function.   Plan of care and goals were discussed with the patient and the patient is in agreement with this plan of care.      Assessment:     HEP/POC compliance is  good .  Patient demonstrates understanding/independence with home program.  Patient is benefitting from skilled physical therapy and is making steady progress toward functional goals.  Patient is appropriate to continue with skilled physical therapy intervention, as indicated by initial plan of care.    Goal Status:  Pt. will be independent with home exercise program in : 6 weeks  Pt. will have improved quality of sleep: with less pain;waking less times/night;in 6 weeks  Pt. will bend: to dress;to clean;for dependent care;with less difficulty;for self care;for work;in 6 weeks  Patient Turn Head: for computer;for work;with less pain;with less difficulty;in 6 weeks  Patient will look up / down: for drinking;for reading;with less pain;with less difficulty;in 6 weeks  Patient will decrease : GEO score;for improved quality of function;in 6 weeks;by _ points  by ___ points: 6 points    Plan / Patient Education:     Continue with initial plan of care.  Progress with home program as tolerated.  Plan for next visit: continue  with manual therapy on cervical spine, continue to assess low back, begin to progress HEP as able        Subjective:     Pain Ratin  Doing well this morning so far but the more she moves her neck and walks the increase in pain. She was better after the last session a little and she noticed that she was able to turn her head a little more without pain when she was driving this morning. Exercises are going well.       Objective:     HEP:  - upper trap stretching  - scalene stretching  - levator scap stretching  - scap sets with B ER - no resistance   - shoulder rolls   Added   - supine chin tuck x 10 reps  - foam roll pec stretch with alejandra wings x 10 reps     Treatment Today   10/5/2017  TREATMENT MINUTES COMMENTS   Evaluation     Self-care/ Home management     Manual therapy 15 Cervical distraction with suboccipital release, STM to levator scap, UT and scalenes on right side    Neuromuscular Re-education     Therapeutic Activity     Therapeutic Exercises 10 UBE F/B x 3 min WL 4  Exercises are above    Gait training     Modality__________________                Total 25    Blank areas are intentional and mean the treatment did not include these items.     Aurora Pineda, PT, DPT   10/5/2017

## 2021-06-13 NOTE — TELEPHONE ENCOUNTER
Refill Approved    Rx renewed per Medication Renewal Policy. Medication was last renewed on 12/13/19.    Ashli Clarke, [facility] Triage/Med Refill 11/24/2020     Requested Prescriptions   Pending Prescriptions Disp Refills     losartan (COZAAR) 25 MG tablet [Pharmacy Med Name: LOSARTAN POTASSIUM 25 MG Tablet] 90 tablet 3     Sig: TAKE 1 TABLET EVERY DAY       Angiotensin Receptor Blocker Protocol Passed - 11/23/2020  4:26 PM        Passed - PCP or prescribing provider visit in past 12 months       Last office visit with prescriber/PCP: Visit date not found OR same dept: Visit date not found OR same specialty: Visit date not found  Last physical: 1/21/2020 Last MTM visit: Visit date not found   Next visit within 3 mo: Visit date not found  Next physical within 3 mo: Visit date not found  Prescriber OR PCP: Jennifer Hankins MD  Last diagnosis associated with med order: 1. Essential hypertension with goal blood pressure less than 140/90  - losartan (COZAAR) 25 MG tablet [Pharmacy Med Name: LOSARTAN POTASSIUM 25 MG Tablet]; TAKE 1 TABLET EVERY DAY  Dispense: 90 tablet; Refill: 3  - metoprolol succinate (TOPROL-XL) 50 MG 24 hr tablet [Pharmacy Med Name: METOPROLOL SUCCINATE ER 50 MG Tablet Extended Release 24 Hour]; TAKE 1 TABLET TWICE DAILY  Dispense: 180 tablet; Refill: 3    If protocol passes may refill for 12 months if within 3 months of last provider visit (or a total of 15 months).             Passed - Serum potassium within the past 12 months     Lab Results   Component Value Date    Potassium 4.4 01/21/2020             Passed - Blood pressure filed in past 12 months     BP Readings from Last 1 Encounters:   01/21/20 130/78             Passed - Serum creatinine within the past 12 months     Creatinine   Date Value Ref Range Status   01/21/2020 0.87 0.60 - 1.10 mg/dL Final                metoprolol succinate (TOPROL-XL) 50 MG 24 hr tablet [Pharmacy Med Name: METOPROLOL SUCCINATE ER 50 MG Tablet Extended  Release 24 Hour] 180 tablet 3     Sig: TAKE 1 TABLET TWICE DAILY       Beta-Blockers Refill Protocol Passed - 11/23/2020  4:26 PM        Passed - PCP or prescribing provider visit in past 12 months or next 3 months     Last office visit with prescriber/PCP: Visit date not found OR same dept: Visit date not found OR same specialty: Visit date not found  Last physical: 1/21/2020 Last MTM visit: Visit date not found   Next visit within 3 mo: Visit date not found  Next physical within 3 mo: Visit date not found  Prescriber OR PCP: Jennifer Hankins MD  Last diagnosis associated with med order: 1. Essential hypertension with goal blood pressure less than 140/90  - losartan (COZAAR) 25 MG tablet [Pharmacy Med Name: LOSARTAN POTASSIUM 25 MG Tablet]; TAKE 1 TABLET EVERY DAY  Dispense: 90 tablet; Refill: 3  - metoprolol succinate (TOPROL-XL) 50 MG 24 hr tablet [Pharmacy Med Name: METOPROLOL SUCCINATE ER 50 MG Tablet Extended Release 24 Hour]; TAKE 1 TABLET TWICE DAILY  Dispense: 180 tablet; Refill: 3    If protocol passes may refill for 12 months if within 3 months of last provider visit (or a total of 15 months).             Passed - Blood pressure filed in past 12 months     BP Readings from Last 1 Encounters:   01/21/20 130/78

## 2021-06-13 NOTE — PROGRESS NOTES
[facility] Certification Request    October 3, 2017      Patient: June Quintero  MR Number: [id number]  YOB: 1950  Date of Visit: 10/3/2017      Dear Dr. Simon Jon:    Thank you for this referral.   We are seeing uJne Quintero for Physical Therapy of neck pain and SI pain.    Medicare and/or Medicaid requires physician review and approval of the treatment plan. Please review the plan of care and verify that you agree with the therapy plan of care by co-signing this note.      Plan of Care  Authorization / Certification Start Date: 10/03/17  Authorization / Certification End Date: 01/01/18  Communication with: Referral Source  Patient Related Instruction: Nature of Condition;Treatment plan and rationale;Self Care instruction;Basis of treatment;Body mechanics;Posture;Precautions;Next steps;Expected outcome  Times per Week: 1-2  Number of Weeks: 6-8  Number of Visits: 12 sessions  Therapeutic Exercise: ROM;Stretching;Strengthening  Neuromuscular Reeducation: kinesio tape;posture;core;balance/proprioception  Manual Therapy: soft tissue mobilization;myofascial release;joint mobilization;muscle energy;strain counterstrain    Goals:  Pt. will be independent with home exercise program in : 6 weeks  Pt. will have improved quality of sleep: with less pain;waking less times/night;in 6 weeks  Pt. will bend: to dress;to clean;for dependent care;with less difficulty;for self care;for work;in 6 weeks  Patient Turn Head: for computer;for work;with less pain;with less difficulty;in 6 weeks  Patient will look up / down: for drinking;for reading;with less pain;with less difficulty;in 6 weeks  Patient will decrease : GEO score;for improved quality of function;in 6 weeks;by _ points  by ___ points: 6 points      If you have any questions or concerns, please don't hesitate to call.    Sincerely,      Aurora Pineda, PT        Physician recommendation:     ___ Follow therapist's recommendation         ___ Modify therapy      *Physician co-signature indicates they certify the need for these services furnished within this plan and while under their care.        [facility]   Cervical Thoracic Initial Evaluation    Patient Name: June Quintero  Date of evaluation: 10/3/2017  Referral Diagnosis: SI (sacroiliac) pain  Referring provider: Simon Jon MD  Visit Diagnosis:     ICD-10-CM    1. Cervicalgia M54.2    2. Acute right-sided low back pain without sciatica M54.5    3. Muscle weakness (generalized) M62.81    4. Poor posture R29.3        Assessment:     June Quintero is a 67 y.o. female who presents to therapy today with chief complaints of right sided neck pain and shoulder pain, as well as right sided low back and hip pain that started 3 weeks ago without a specific injury but could have been reaching overhead without use of stool and over extending herself. Patient reports the MD requested PT for low back but the right side of her neck and shoulder is more painful and bothersome overall. She presents with decreased ROM mildly in all cervical motions with either pain or tension on the right side of the low back. Patient as decreased ROM in lumbar spine with tightness in the low back on the right side. Patient refused to try lumbar rotation for fear of increase of pain. Patient will benefit from skilled PT intervention to increase ROM, decrease pain and regain function.   Plan of care and goals were discussed with the patient and the patient is in agreement with this plan of care.     Impairments in  pain, posture, ROM, joint mobility, strength  The POC is dynamic and will be modified on an ongoing basis.  Barriers to achieving goals as noted in the assessment section may affect outcome.  Prognosis to achieve goals is  good   Pt. is appropriate for skilled PT intervention as outlined in the Plan of Care (POC).  Pt. is a good candidate for skilled PT services to improve pain levels and  function.    Goals:  Pt. will be independent with home exercise program in : 6 weeks  Pt. will have improved quality of sleep: with less pain;waking less times/night;in 6 weeks  Pt. will bend: to dress;to clean;for dependent care;with less difficulty;for self care;for work;in 6 weeks  Patient Turn Head: for computer;for work;with less pain;with less difficulty;in 6 weeks  Patient will look up / down: for drinking;for reading;with less pain;with less difficulty;in 6 weeks  Patient will decrease : GEO score;for improved quality of function;in 6 weeks;by _ points  by ___ points: 6 points    Patient's expectations/goals are realistic.    Barriers to Learning or Achieving Goals:  Co-morbidities or other medical factors.  HTN, osteoporosis       Plan / Patient Instructions:        Plan of Care:   Authorization / Certification Start Date: 10/03/17  Authorization / Certification End Date: 01/01/18  Communication with: Referral Source  Patient Related Instruction: Nature of Condition;Treatment plan and rationale;Self Care instruction;Basis of treatment;Body mechanics;Posture;Precautions;Next steps;Expected outcome  Times per Week: 1-2  Number of Weeks: 6-8  Number of Visits: 12 sessions  Therapeutic Exercise: ROM;Stretching;Strengthening  Neuromuscular Reeducation: kinesio tape;posture;core;balance/proprioception  Manual Therapy: soft tissue mobilization;myofascial release;joint mobilization;muscle energy;strain counterstrain    Plan for next visit: manual therapy on cervical spine, continue to assess low back, begin to progress HEP as able      Subjective:         Social information:   Living Situation:single family home, lives with others  and has assistance Yes   Occupation:homemaker   Work Status:NA   Equipment Available: None    History of Present Illness:    June is a 67 y.o. female who presents to therapy today with complaints of right neck, right shoulder and the right sided low back into the hip posterior hip  into the upper thight. Pain started 3 weeks ago after she did a lot of reaching overhead and should have used her step stool but did not and over extended herself. She does have a history of sciatic pain on the left side in which therapy was beneficial, denies history of neck or shoulder pain in her past. Neck pain is worse, se describes the pain burning and numbness across the jaw and tightness aching pain into the neck. Describes the pain in the hip as just pain, tight ball of pain, annoying. Pain is worse with: stretching and movement of the neck, walking makes the hip pain worse.     Pain Rating:10 (neck) 4/10 pain in the hip  Pain rating at best: 4 (neck) 1-2/10 (hip)   Pain rating at worst: 10  Pain description: aching, burning, numbness, pain and weakness    Functional limitations are described as occurring with:   bending  gripping, holding and manipulating fingers  lifting  looking up and turning head  reaching overhead and behind back  repetitive movements  performing routine daily activities  sleeping  standing >10 min    Patient reports benefit from:  rest  , heat         Objective:      Note: Items left blank indicates the item was not performed or not indicated at the time of the evaluation.    Patient Outcome Measures :    Modified Oswestry Low Back Pain Disablity Questionnaire  in %: 40   Scores range from 0-100%, where a score of 0% represents minimal pain and maximal function. The minimal clinically important difference is a score reduction of 12%.    Cervical Thoracic Examination  1. Cervicalgia     2. Acute right-sided low back pain without sciatica     3. Muscle weakness (generalized)     4. Poor posture       Involved side: Right  Posture Observation:      General sitting posture is  normal.  Shoulder/Thoracic complex: Mild bilateral scapular protraction   Mildly increased upper thoracic kyphosis  Lumbopelvic complex: Mildly decreased lumbar lordosis    Cervical ROM:  10/3/2017  Date: 10/3/2017      *Indicate scale AROM AROM AROM   Cervical Flexion 33 pulling in the neck      Cervical Extension 20       Right Left Right Left Right Left   Cervical Sidebending 18 pain R  15 pulling R       Cervical Rotation 38 pain R 42 pulling R side        Cervical Protraction WNL     Cervical Retraction WNL     Lumbar  Flexion To distal tibia, pain and tension in low back     Lumbar Extension Mod dec, no pain     Lumbar Sidebending Min dec  Min dec pain       Lumbar Rotation Would not try Would not try          Strength   10/3/2017  Date: 10/3/2017     Cervical Myotomes/5 Right Left Right Left Right Left   Cervical Flexion (C1-2)         Cervical Sidebending (C3)         Shoulder Elevation (C4) 5 5       Shoulder Abduction (C5) 5 5       Elbow Flexion (C6) 5 5       Elbow Extension (C7) 5 5       Wrist Flexion (C7) 5 5       Wrist Extension (C6) 5 5         Hip/Knee Strength: 10/3/2017  Date: 10/3/2017     Hip/Knee Strength (/5) MMT MMT MMT    Right Left Right Left Right Left   Hip Flexion 4+ 4+       Hip Abduction 4 4       Hip Adduction 5 5       Hip Extension         Hip External Rotation 5 5       Hip Internal Rotation 5 5       Knee Extension 5 5       Knee Flexion 5 5         Sensation   WNL to light touch         Cervical Special Tests   10/3/2017  Cervical Special Tests Right Left UE Nerve Mobility Right Left   Cervical compression - - Median nerve - -   Cervical distraction Mild dec in pain  Ulnar nerve - -   Spurling s test +  - Radial nerve - -   Shoulder abduction sign   Thoracic outlet     Deep neck flexor endurance test   Estrellita     Upper cervical rotation   Adson s     Sharper-Jony   Cervical rotation lateral flexion     Alar ligament test   Other:     Other:   Other:       Lumbar Special Tests: 10/3/2017  Lumbar Special Tests Right Left SI Tests Right  Left   Quadrant test   SI Compression     Straight leg raise - - SI Distraction     Crossover response - - POSH Test     Slump - - Sacral Thrust     Sit-up  test  FADIR - -   Trunk extensor endurance test  Resisted Abduction - -   Prone instability test  Other:     Pubic shotgun  Other:       Treatment Today   10/3/2017  TREATMENT MINUTES COMMENTS   Evaluation 40    Self-care/ Home management     Manual therapy 10 Cervical distraction with suboccipital release, STM to levator scap, UT and scalenes on right side    Neuromuscular Re-education     Therapeutic Activity     Therapeutic Exercises 13 discussed POC and pathology, initiated HEP with handouts given.   - upper trap stretching  - scalene stretching  - levator scap stretching  - scap sets with B ER - no resistance   - shoulder rolls    Gait training     Modality__________________                Total 63     Blank areas are intentional and mean the treatment did not include these items.   PT Evaluation Code: (Please list factors)  Patient History/Comorbidities: as above   Examination: as above   Clinical Presentation: stable   Clinical Decision Making: low     Patient History/  Comorbidities Examination  (body structures and functions, activity limitations, and/or participation restrictions) Clinical Presentation Clinical Decision Making (Complexity)   No documented Comorbidities or personal factors 1-2 Elements Stable and/or uncomplicated Low   1-2 documented comorbidities or personal factor 3 Elements Evolving clinical presentation with changing characteristics Moderate   3-4 documented comorbidities or personal factors 4 or more Unstable and unpredictable High     Aurora Pineda, PT, DPT   10/3/2017  10:44 AM

## 2021-06-13 NOTE — PROGRESS NOTES
[facility] Clinic Note  Patient Name: June Quintero  Patient Age: 67 y.o.  YOB: 1950  MRN: [id number]  ?  Date of Visit: 9/28/2017  Reason for Office Visit:   Chief Complaint   Patient presents with     Neck Pain     Has had neck pain for 3 weeks. Extending to the right shoulder, and down to the back. When getting up the pain will go down into the right hip. Tends to lift things that she is not suppose to be lifting.      Back Pain     Hip Pain     HPI: June Quintero 67 y.o. female who presents to clinic for neck pain right sided, x 3 weeks, sometimes radiates down right shoulder and right hip is painful when rising seated position. No trauma or falls. Sometime pain is sharp and sometimes dull. Pain in back right buttock area. No radiculopathy, weakness, tingling or numbness. Heat helps pain. She has to take three tylenol to help the pain.     Review of Systems: As noted in HPI     Current Scheduled Meds:  Outpatient Encounter Prescriptions as of 9/28/2017   Medication Sig Dispense Refill     calcium-vitamin D (CALCIUM-VITAMIN D) 500 mg(1,250mg) -200 unit per tablet Take 2 tablets by mouth daily.       cyanocobalamin (VITAMIN B-12) 1000 MCG tablet Take 1,000 mcg by mouth daily.       famotidine (PEPCID) 20 MG tablet Take 20 mg by mouth 2 (two) times a day.       ketotifen (ZADITOR/ZYRTEC ITCHY EYES) 0.025 % ophthalmic solution 1 drop 2 (two) times a day.       losartan (COZAAR) 100 MG tablet Take 1 tablet (100 mg total) by mouth daily. 90 tablet 3     metoprolol succinate (TOPROL-XL) 50 MG 24 hr tablet Take 1 tablet (50 mg total) by mouth 2 (two) times a day. 180 tablet 3     MULTIVITAMIN ORAL Take by mouth.       simvastatin (ZOCOR) 20 MG tablet Take 1 tablet (20 mg total) by mouth at bedtime. 90 tablet 3     timolol (BETIMOL) 0.5 % ophthalmic solution 1 drop 2 (two) times a day.       vitamin E 400 UNIT capsule Take 400 Units by mouth daily.       [DISCONTINUED] losartan (COZAAR) 100  "MG tablet TAKE 1 TABLET EVERY DAY 90 tablet 1     [DISCONTINUED] metoprolol succinate (TOPROL-XL) 50 MG 24 hr tablet TAKE 1 TABLET TWICE DAILY 180 tablet 1     [DISCONTINUED] simvastatin (ZOCOR) 20 MG tablet TAKE 1 TABLET AT BEDTIME 90 tablet 1     cyclobenzaprine (FLEXERIL) 5 MG tablet Take 1 tablet (5 mg total) by mouth 3 (three) times a day as needed for muscle spasms. 30 tablet 0     predniSONE (DELTASONE) 10 mg tablet Take 2 tabs a day x 4 days and 1 tab for another 4 days 12 tablet 0     No facility-administered encounter medications on file as of 9/28/2017.        Objective / Physical Examination:  /74  Pulse 74  Ht 4' 10\" (1.473 m)  Wt 132 lb (59.9 kg)  LMP 10/14/1994  BMI 27.59 kg/m2  Wt Readings from Last 3 Encounters:   09/28/17 132 lb (59.9 kg)   10/20/16 122 lb 4.8 oz (55.5 kg)   08/17/16 123 lb 1.6 oz (55.8 kg)     Body mass index is 27.59 kg/(m^2). (>25?)    General Appearance: Alert and oriented in no acute distress  Neck: Supple, full ROM, spurlings negative  Back: Symmetrical, upper right back tenderness around trapezius and paraspinal muscles. Able to flex/ext fully, rotation to left exacerbates pain. Right lower SIJ tenderness, SLR negative.   Cardiovascular: RRR  Extremities:m Strength equal throughout.  Integumentary: Warm and dry. Without suspicious looking lesions  Neuro: Alert and oriented, follows commands appropriately. Gait normal     Assessment / Plan / Medical Decision Making:      Encounter Diagnoses   Name Primary?     SI (sacroiliac) pain Yes     Right shoulder pain      Hyperlipidemia      Essential hypertension with goal blood pressure less than 140/90         1. SI (sacroiliac) pain  SIJ dysfunction? Recommend PT, ice for next 48 hours and then heat and stretching. Offered some techniques for stretching.   She is unable to tolerate NSAIDs so will try a short course of prednisone to help inflammation reduction   - predniSONE (DELTASONE) 10 mg tablet; Take 2 tabs a day x " 4 days and 1 tab for another 4 days  Dispense: 12 tablet; Refill: 0  - Ambulatory referral to Physical Therapy    2. Right shoulder pain  Upper trap, neck paraspinous muscle tenderness likely in the setting of OA. Will try a muscle relaxant prn and stretching.   - cyclobenzaprine (FLEXERIL) 5 MG tablet; Take 1 tablet (5 mg total) by mouth 3 (three) times a day as needed for muscle spasms.  Dispense: 30 tablet; Refill: 0    3. Hyperlipidemia  - simvastatin (ZOCOR) 20 MG tablet; Take 1 tablet (20 mg total) by mouth at bedtime.  Dispense: 90 tablet; Refill: 3    4. Essential hypertension with goal blood pressure less than 140/90  - metoprolol succinate (TOPROL-XL) 50 MG 24 hr tablet; Take 1 tablet (50 mg total) by mouth 2 (two) times a day.  Dispense: 180 tablet; Refill: 3  - losartan (COZAAR) 100 MG tablet; Take 1 tablet (100 mg total) by mouth daily.  Dispense: 90 tablet; Refill: 3    Follow up with PCP in 1 month. Labs at that time, bmp, etc. Return sooner if pain not improving    Total time spent with patient was 15 minutes with >50% of time spent in face-to-face counseling regarding the above plan     Simon Jon MD  [facility]

## 2021-06-13 NOTE — PROGRESS NOTES
[facility] Clinic Follow Up Note    June Quintero   67 y.o. female    Date of Visit: 10/10/2017    Chief Complaint   Patient presents with     Back Pain     Follow-up     Subjective  June comes in today for follow-up of her chronic problems plus she has been having some increased neck pain and low back pain.  She was seen at an outside clinic about 2 weeks ago when she has been doing some therapy which has been slightly helpful but she still having some pain in her neck.  She does not think the cyclobenzaprine is working well.  She would like to have a pain medication.    ROS A comprehensive review of systems was performed and was otherwise negative    Social History:   Social History     Social History Narrative    She is . They do not have children. 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 She does not smoke cigarettes or drink alcohol.       Medications were reconciled.  Allergies, social and family history, and the problem list were all reviewed and updated.    Exam  General Appearance: Pleasant and alert  Vitals:    10/10/17 0942   BP: 132/70   Patient Site: Left Arm   Patient Position: Sitting   Cuff Size: Adult Regular   Pulse: 66   Weight: 132 lb (59.9 kg)      Body mass index is 27.59 kg/(m^2).  Wt Readings from Last 3 Encounters:   10/10/17 132 lb (59.9 kg)   09/28/17 132 lb (59.9 kg)   10/20/16 122 lb 4.8 oz (55.5 kg)     HEENT: Sclera are clear.   Lungs: Normal respirations  Abdomen: Soft and nondistended  Extremities: No edema  Skin: No rashes  Neuro: Moves all extremities and has facial symmetry  Gait: Ambulates with a normal gait    Assessment/Plan  1. Neck pain  She was given some tramadol to use as needed.  She was also given some baclofen to take as needed for spasms.  She will continue with therapy.    2. Health care maintenance  She was given Hemoccult cards for colon cancer screening.    3.  Hypertension  Stable on medication.  - Basic Metabolic Panel  - HM2(CBC w/o Differential)  - Thyroid Stimulating Hormone (TSH)    4. Hypercholesteremia  sTable on a statin.  - Hepatic Profile  - Lipid Cascade    5. Osteoporosis  She remains on Prolia and has tolerated it well.  - Vitamin D, Total (25-Hydroxy)          April D MD Anselmo  10/10/2017    Much or all of the text in this note was generated through the use of Dragon Dictate voice-to-text software. Errors in spelling or words which seem out of context are unintentional. Sound alike errors, in particular, may have escaped editing.

## 2021-06-15 NOTE — TELEPHONE ENCOUNTER
Patient is calling because she will need all [facility] medications [facility] due to her insurance changing.  [facility] ID number is [id number].  Please call patient at [phone].  Patient will need these two prescriptions filled in the next week.

## 2021-06-16 NOTE — PROGRESS NOTES
Assessment and Plan:     Patient has been advised of split billing requirements and indicates understanding: Yes     1. Routine general medical examination at a health care facility  Routine Medicare wellness visit, updated in EMR.  Patient was given advanced directive paperwork today.  Immunizations are up-to-date with the exception of shingles shot, cost prohibitive for patient currently, and PPSV23.  We will discuss this at a future visit as she is in the middle of her Covid series.  She is also due for bone density testing, ordered today.  Mammogram is scheduled tomorrow, colon cancer screening is up-to-date.  See below for problem specific plans, plan repeat physical in 1 year.    2. Hypertension  Reasonably well-controlled on current regimen of losartan and metoprolol.  Labs updated as below, no changes needed at present.  - metoprolol succinate (TOPROL-XL) 50 MG 24 hr tablet; Take 1 tablet (50 mg total) by mouth 2 (two) times a day.  Dispense: 180 tablet; Refill: 3  - Comprehensive Metabolic Panel  - HM2(CBC w/o Differential)  - Lipid Cascade FASTING    3. Hypercholesteremia  Continues on simvastatin and tolerates this well.  Lipid profile updated today.  - Comprehensive Metabolic Panel  - Lipid Cascade FASTING    4. Osteoporosis without current pathological fracture, unspecified osteoporosis type  Patient is overdue for bone density testing.  She stopped her Prolia to have some dental work done.  This is scheduled in the next couple of weeks.  She will recheck a bone density and will forward to Dr. Jean to decide when to restart Prolia.  - Comprehensive Metabolic Panel  - DXA Bone Density Scan; Future  - DXA Bone Density Scan    5. Impaired fasting glucose  Last year blood sugar was quite elevated, but A1c was normal.  Plan recheck both today.  - Comprehensive Metabolic Panel  - Glycosylated Hemoglobin A1c    6. Alcohol use disorder, mild, abuse  Discussed patient's alcohol use at some length today.  I  advised her to cut back significantly as this can increase cancer risk and liver dysfunction.  Labs will be updated as below.  - Comprehensive Metabolic Panel  - HM2(CBC w/o Differential)    7. Hip pain, right  I suspect this is trochanteric bursitis versus muscular pain as patient denies any groin pain and the pain actually improves with activity.  Offered a plain x-ray for further evaluation today, patient declines for now.  She will follow-up as needed.  Discussed staying active and trying some stretching exercises.    The patient's current medical problems were reviewed.    I have had an Advance Directives discussion with the patient.  The following health maintenance schedule was reviewed with the patient and provided in printed form in the after visit summary:   Health Maintenance Due   Topic Date Due     HEPATITIS C SCREENING  Never done     ZOSTER VACCINES (2 of 3) 10/05/2015     Pneumococcal Vaccine: 65+ Years (2 of 2 - PPSV23) 08/17/2017     TD 18+ HE  04/16/2018     COVID-19 Vaccine (2 - Moderna 2-dose series) 04/04/2021        Subjective:   Chief Complaint: June Quintero is an 71 y.o. female here for an Annual Wellness visit.     HPI: Patient reports that overall she feels well with the exception of some right hip discomfort.  She points to the outer aspect of her hip over the trochanteric bursa as the site of pain.  Pain actually improves when she is up moving around.  She has been walking daily.  She tries to get 10,000 steps daily.  She does raise her heart rate with her walking.  It feels worse when she is sitting or being sedentary.  She denies any groin pain.  She will be having some dental work done in the next couple of weeks, saw the dentist this morning.    Review of Systems:  Please see above.  The rest of the complete review of systems are negative for all systems.    Patient Care Team:  Jennifer Hankins MD as PCP - General (Family Medicine)  Jennifer Hankins MD as Assigned PCP      Patient Active Problem List   Diagnosis     Hypercholesteremia     Hypertension     Osteoporosis     Allergic rhinitis     Impaired Fasting Glucose     Adjustment disorder with anxious mood     Alcohol use disorder, mild, abuse     History reviewed. No pertinent past medical history.   History reviewed. No pertinent surgical history.   Family History   Problem Relation Age of Onset     Breast cancer Sister 60        fraternal twin     Hearing loss Sister      Dementia Mother          age 82     Heart disease Father          age 60- had a hole in heart     Parkinsonism Brother      Hearing loss Brother      Hearing loss Brother      Diabetes Sister      Hearing loss Sister      Colon cancer Neg Hx       Social History     Socioeconomic History     Marital status:      Spouse name: Not on file     Number of children: Not on file     Years of education: Not on file     Highest education level: Not on file   Occupational History     Not on file   Social Needs     Financial resource strain: Not on file     Food insecurity     Worry: Not on file     Inability: Not on file     Transportation needs     Medical: Not on file     Non-medical: Not on file   Tobacco Use     Smoking status: Never Smoker     Smokeless tobacco: Never Used   Substance and Sexual Activity     Alcohol use: Yes     Alcohol/week: 14.0 - 21.0 standard drinks     Types: 14 - 21 Cans of beer per week     Drug use: Never     Sexual activity: Not Currently     Comment:  2019   Lifestyle     Physical activity     Days per week: Not on file     Minutes per session: Not on file     Stress: Not on file   Relationships     Social connections     Talks on phone: Not on file     Gets together: Not on file     Attends Mosque service: Not on file     Active member of club or organization: Not on file     Attends meetings of clubs or organizations: Not on file     Relationship status: Not on file     Intimate partner violence     Fear  "of current or ex partner: Not on file     Emotionally abused: Not on file     Physically abused: Not on file     Forced sexual activity: Not on file   Other Topics Concern     Not on file   Social History Narrative    She is . They do not have children. 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 She does not smoke cigarettes or drink alcohol.      Current Outpatient Medications   Medication Sig Dispense Refill     calcium-vitamin D (CALCIUM-VITAMIN D) 500 mg(1,250mg) -200 unit per tablet Take 2 tablets by mouth daily.       cyanocobalamin (VITAMIN B-12) 1000 MCG tablet Take 1,000 mcg by mouth daily.       ketotifen (ZADITOR/ZYRTEC ITCHY EYES) 0.025 % ophthalmic solution 1 drop 2 (two) times a day.       losartan (COZAAR) 25 MG tablet Take 1 tablet (25 mg total) by mouth daily. 90 tablet 0     metoprolol succinate (TOPROL-XL) 50 MG 24 hr tablet Take 1 tablet (50 mg total) by mouth 2 (two) times a day. 180 tablet 3     MULTIVITAMIN ORAL Take by mouth.       simvastatin (ZOCOR) 10 MG tablet Take 1 tablet (10 mg total) by mouth at bedtime. 90 tablet 0     timolol (BETIMOL) 0.5 % ophthalmic solution 1 drop 2 (two) times a day.       vitamin E 400 UNIT capsule Take 400 Units by mouth daily.       famotidine (PEPCID) 20 MG tablet Take 20 mg by mouth 2 (two) times a day.       Current Facility-Administered Medications   Medication Dose Route Frequency Provider Last Rate Last Admin     denosumab 60 mg (PROLIA 60 mg/ml)  60 mg Subcutaneous Q6 Months Sarah Briones MD   60 mg at 01/28/20 1036      Objective:     Visit Vitals  /82 (Patient Site: Right Arm, Patient Position: Sitting, Cuff Size: Adult Regular)   Pulse 74   Temp 98  F (36.7  C) (Oral)   Resp 22   Ht 4' 9\" (1.448 m)   Wt 131 lb 9.6 oz (59.7 kg)   LMP 10/14/1994   SpO2 99%   BMI 28.48 kg/m           VisionScreening:  No exam data present     PHYSICAL EXAM  /82 (Patient Site: " "Right Arm, Patient Position: Sitting, Cuff Size: Adult Regular)   Pulse 74   Temp 98  F (36.7  C) (Oral)   Resp 22   Ht 4' 9\" (1.448 m)   Wt 131 lb 9.6 oz (59.7 kg)   LMP 10/14/1994   SpO2 99%   BMI 28.48 kg/m      General Appearance:    Alert, cooperative, no distress, appears stated age   Head:    Normocephalic, without obvious abnormality, atraumatic   Eyes:    PERRL, conjunctiva/corneas clear, EOM's intact both eyes   Ears:    Normal TM's and external ear canals, both ears   Nose:   Nares normal, septum midline, mucosa normal, no drainage     or sinus tenderness   Throat:   Lips, mucosa, and tongue normal; teeth and gums normal   Neck:   Supple, symmetrical, trachea midline, no adenopathy;     thyroid: no enlargement/tenderness/nodules   Back:     Symmetric, no curvature, ROM normal, no CVA tenderness   Lungs:     Clear to auscultation bilaterally, respirations unlabored   Chest Wall:    No tenderness or deformity    Heart:    Regular rate and rhythm, S1 and S2 normal, no murmur, rub    or gallop   Breast Exam:    No tenderness, masses, or nipple abnormality; large, pendulous breasts bilaterally   Abdomen:     Soft, non-tender, bowel sounds active all four quadrants,     no masses, no organomegaly   Genitalia:    Not examined   Rectal:    Not examined   Extremities:   Extremities normal, atraumatic, no cyanosis or edema   Pulses:   2+ and symmetric all extremities   Skin:   Skin color, texture, turgor normal, no rashes or lesions   Lymph nodes:   Cervical, supraclavicular, and axillary nodes normal   Neurologic:   CNII-XII intact, normal strength, sensation and reflexes     throughout         Assessment Results 3/31/2021   Activities of Daily Living No help needed   Instrumental Activities of Daily Living No help needed   Mini Cog Total Score 4   Some recent data might be hidden     A Mini-Cog score of 0-2 suggests the possibility of dementia, score of 3-5 suggests no dementia    Identified Health Risks: "     The patient reports that she drinks more than one alcoholic drink per day but denies binge or excessive drinking. She was counseled and given information about possible harmful effects of excessive alcohol intake.  Information on urinary incontinence and treatment options given to patient.  Information regarding advance directives (living eckert), including where she can download the appropriate form, was provided to the patient via the AVS.

## 2021-06-17 NOTE — PATIENT INSTRUCTIONS - HE
Patient Instructions by Jennifer Hankins MD at 1/21/2020 10:00 AM     Author: Jennifer Hankins MD Service: -- Author Type: Physician    Filed: 1/21/2020 10:43 AM Encounter Date: 1/21/2020 Status: Signed    : Jennifer Hankins MD (Physician)         Patient Education   Alcohol Use   Many people can enjoy a glass of wine or beer without any negative consequences to their health. According to the Centers for Disease Control and Prevention (CDC), having one or fewer drinks per day for women and two or fewer per day for men is considered moderate drinking.     When people drink more than moderately, it can become concerning. Excessive drinking is defined as consuming 15 drinks or more per week for men and 8 drinks or more per week for women. There are various health problems associated with excessive drinking, which include:    Damage to vital organs like the heart, brain, liver and pancreas    Harm to the digestive tract    Weaken the immune system    Higher risk for heart disease and cancer       Patient Education   Understanding Advance Care Planning  Advance care planning is the process of deciding ones own future medical care. It helps ensure that if you cant speak for yourself, your wishes can still be carried out. The plan is a series of legal documents that note a persons wishes. The documents vary by state. Advance care planning may be done when a person has a serious illness that is expected to get worse. It may be done before major surgery. And it can help you and your family be prepared in case of a major illness or injury. Advance care planning helps with making decisions at these times.       A health care proxy is a person who acts as the voice of a patient when the patient cant speak for himself or herself. The name of this role varies by state. It may be called a Durable Medical Power of  or Durable Power of  for Healthcare. It may be called an agent, surrogate, or  advocate. Or it may be called a representative or decision maker. It is an official duty that is identified by a legal document. The document also varies by state.    Why Is Advance Care Planning Important?  If a person communicates their healthcare wishes:    They will be given medical care that matches their values and goals.    Their family members will not be forced to make decisions in a crisis with no guidance.  Creating a Plan  Making an advance care plan is often done in 3 steps:    Thinking about ones wishes. To create an advance care plan, you should think about what kind of medical treatment you would want if you lose the ability to communicate. Are there any situations in which you would refuse or stop treatment? Are there therapies you would want or not want? And whom do you want to make decisions for you? There are many places to learn more about how to plan for your care. Ask your doctor or  for resources.    Picking a health care proxy. This means choosing a trusted person to speak for you only when you cant speak for yourself. When you cannot make medical decisions, your proxy makes sure the instructions in your advance care plan are followed. A proxy does not make decisions based on his or her own opinions. They must put aside those opinions and values if needed, and carry out your wishes.    Filling out the legal documents. There are several kinds of legal documents for advance care planning. Each one tells health care providers your wishes. The documents may vary by state. They must be signed and may need to be witnessed or notarized. You can cancel or change them whenever you wish. Depending on your state, the documents may include a Healthcare Proxy form, Living Will, Durable Medical Power of , Advance Directive, or others.  The Familys Role  The best help a family can give is to support their loved ones wishes. Open and honest communication is vital. Family should express  any concerns they have about the patients choices while the patient can still make decisions.    7818-5063 [employer]. [address] [place] [address], [place] [address]. All rights reserved. This information is not intended as a substitute for professional medical care. Always follow your healthcare professional's instructions.         Also, [facility] offers a free, downloadable health care directive that allows you to share your treatment choices and personal preferences if you cannot communicate your wishes. It also allows you to appoint another person (called a health care agent) to make health care decisions if you are unable to do so. You can download an advance directive by going here: [email or web address]     Patient Education   Personalized Prevention Plan  You are due for the preventive services outlined below.  Your care team is available to assist you in scheduling these services.  If you have already completed any of these items, please share that information with your care team to update in your medical record.  Health Maintenance   Topic Date Due   ? HEPATITIS C SCREENING  1950   ? ZOSTER VACCINES (2 of 3) 10/05/2015   ? PNEUMOCOCCAL IMMUNIZATION 65+ LOW/MEDIUM RISK (2 of 2 - PPSV23) 08/17/2017   ? TD 18+ HE  04/16/2018   ? FECAL OCCULT BLOOD/FIT ANNUAL COLON CANCER SCREENING  10/17/2019   ? MEDICARE ANNUAL WELLNESS VISIT  10/29/2019   ? FALL RISK ASSESSMENT  01/17/2020   ? MAMMOGRAM  12/05/2020   ? DXA SCAN  12/05/2020   ? LIPID  10/29/2023   ? ADVANCE CARE PLANNING  10/29/2023   ? INFLUENZA VACCINE RULE BASED  Completed

## 2021-06-17 NOTE — TELEPHONE ENCOUNTER
Refill Approved    Rx renewed per Medication Renewal Policy. Medication was last renewed on 2/17/21.    Og Fitzpatrick, [facility] Triage/Med Refill 4/27/2021     Requested Prescriptions   Pending Prescriptions Disp Refills     simvastatin (ZOCOR) 10 MG tablet [Pharmacy Med Name: SIMVASTATIN TABS 10MG] 90 tablet 3     Sig: TAKE 1 TABLET AT BEDTIME       Statins Refill Protocol (Hmg CoA Reductase Inhibitors) Passed - 4/26/2021  4:33 PM        Passed - PCP or prescribing provider visit in past 12 months      Last office visit with prescriber/PCP: Visit date not found OR same dept: Visit date not found OR same specialty: Visit date not found  Last physical: 3/31/2021 Last MTM visit: Visit date not found   Next visit within 3 mo: Visit date not found  Next physical within 3 mo: Visit date not found  Prescriber OR PCP: Jennifer Hankins MD  Last diagnosis associated with med order: 1. Hypercholesteremia  - simvastatin (ZOCOR) 10 MG tablet [Pharmacy Med Name: SIMVASTATIN TABS 10MG]; TAKE 1 TABLET AT BEDTIME  Dispense: 90 tablet; Refill: 3    2. Essential hypertension with goal blood pressure less than 140/90  - losartan (COZAAR) 25 MG tablet [Pharmacy Med Name: LOSARTAN TABS 25MG]; TAKE 1 TABLET DAILY  Dispense: 90 tablet; Refill: 3    If protocol passes may refill for 12 months if within 3 months of last provider visit (or a total of 15 months).                losartan (COZAAR) 25 MG tablet [Pharmacy Med Name: LOSARTAN TABS 25MG] 90 tablet 3     Sig: TAKE 1 TABLET DAILY       Angiotensin Receptor Blocker Protocol Passed - 4/26/2021  4:33 PM        Passed - PCP or prescribing provider visit in past 12 months       Last office visit with prescriber/PCP: Visit date not found OR same dept: Visit date not found OR same specialty: Visit date not found  Last physical: 3/31/2021 Last MTM visit: Visit date not found   Next visit within 3 mo: Visit date not found  Next physical within 3 mo: Visit date not found  Prescriber  OR PCP: Jennifer Hankins MD  Last diagnosis associated with med order: 1. Hypercholesteremia  - simvastatin (ZOCOR) 10 MG tablet [Pharmacy Med Name: SIMVASTATIN TABS 10MG]; TAKE 1 TABLET AT BEDTIME  Dispense: 90 tablet; Refill: 3    2. Essential hypertension with goal blood pressure less than 140/90  - losartan (COZAAR) 25 MG tablet [Pharmacy Med Name: LOSARTAN TABS 25MG]; TAKE 1 TABLET DAILY  Dispense: 90 tablet; Refill: 3    If protocol passes may refill for 12 months if within 3 months of last provider visit (or a total of 15 months).             Passed - Serum potassium within the past 12 months     Lab Results   Component Value Date    Potassium 4.3 03/31/2021             Passed - Blood pressure filed in past 12 months     BP Readings from Last 1 Encounters:   03/31/21 136/82             Passed - Serum creatinine within the past 12 months     Creatinine   Date Value Ref Range Status   03/31/2021 0.85 0.60 - 1.10 mg/dL Final

## 2021-06-17 NOTE — TELEPHONE ENCOUNTER
Informed patient of Dr. Hankins's message below. Pt scheduled for telephone visit 5/12/21 with Dr. Hankins to discuss anxiety medications.

## 2021-06-17 NOTE — PROGRESS NOTES
"June Quintero is a 71 y.o. female who is being evaluated via a billable telephone visit.      What phone number would you like to be contacted at? [phone]  How would you like to obtain your AVS? AVS Preference: Mail a copy.    Assessment & Plan     Episode of anxiety  Discussed at some length today that patient's episode of anxiety prior to her last dental appointment had more to do with rushing to get to the clinic, not knowing where she was going, and worrying that she may be late for her appointment.  She is not particularly worried about dental work itself.  Discussed that this can be ameliorated by planning ahead, arriving early, having time to sit and relax prior to the appointment.  We discussed risks versus benefits of an anxiety medication prior to this next appointment.  She would like to use both Novocain and nitrous oxide, so we decided together not to prescribe a benzodiazepine prior to her upcoming appointment.  If she continues to have anxious episodes prior to dental work, she can let me know.       BMI:   Estimated body mass index is 28.48 kg/m  as calculated from the following:    Height as of 3/31/21: 4' 9\" (1.448 m).    Weight as of 3/31/21: 131 lb 9.6 oz (59.7 kg).     Return in about 4 months (around 9/12/2021) for Recheck blood pressure.    Jennifer Hankins MD  [facility]   June Quintero is 71 y.o. and presents today for the following health issues   HPI     Patient presents today for a telephone visit to discuss a possible controlled substance prescription prior to dental work.  Patient states that at her last dental appointment, her blood pressure was mildly elevated.  She is worried that she cannot have some upcoming dental work if her blood pressure is elevated again.  She had been checking her blood pressure at home, and home readings have been great.  She does admit that at her last appointment, she was new to the clinic, was running " late, stuck in traffic, and then had to walk quite a ways once she arrived.  She was worried that she may be late.  She has an appointment on 5/21 for oral surgery with extractions.  She thinks they will be pulling for teeth.  She has been offered either Novocain alone or Novocain plus nitrous oxide.  She is not particularly worried about the dental work itself.  I reviewed the PDMP today and there is no data for controlled substance prescriptions for this patient.      Review of Systems  Negative except as noted above      Objective       Vitals:  No vitals were obtained today due to virtual visit.    Physical Exam  General: No apparent distress  Respiratory: Breathing comfortably, speaking in full sentences, no cough during the visit  Neurologic: Alert and oriented, good historian        Phone call duration: 7 minutes

## 2021-06-17 NOTE — TELEPHONE ENCOUNTER
Was in for dental appointment, BP was elevated. States her BP is always elevated at the dentist.   Has been monitoring BP at home :    5/6  108/63   5/7  108/72, 92/54 @ 3:22 PM, 121/71 @ 5:42 PM  5/8  107/66,  96/62 @3:38  5/9  112/62   5/10  113/73 @ 9:13 AM, 119/72 in the afternoon  5/11  143/69      Will be having dental work done on 5/21/21 and is requesting medication to take for anxiety prior to her appointment that day. She will have a .   States she has not taken anxiety medication in the past.   Pharmacy requested is [facility].

## 2021-06-18 NOTE — PATIENT INSTRUCTIONS - HE
Patient Instructions by Jennifer Hankins MD at 3/31/2021 10:20 AM     Author: Jennifer Hankins MD Service: -- Author Type: Physician    Filed: 3/31/2021 11:01 AM Encounter Date: 3/31/2021 Status: Signed    : Jennifer Hankins MD (Physician)         Patient Education   Alcohol Use   Many people can enjoy a glass of wine or beer without any negative consequences to their health. According to the Centers for Disease Control and Prevention (CDC), having one or fewer drinks per day for women and two or fewer per day for men is considered moderate drinking.     When people drink more than moderately, it can become concerning. Excessive drinking is defined as consuming 15 drinks or more per week for men and 8 drinks or more per week for women. There are various health problems associated with excessive drinking, which include:    Damage to vital organs like the heart, brain, liver and pancreas    Harm to the digestive tract    Weaken the immune system    Higher risk for heart disease and cancer       Patient Education   Urinary Incontinence, Female (Adult)  Urinary incontinence means loss of control of the bladder. This problem affects many women, especially as they get older. If you have incontinence, you may be embarrassed to ask for help. But know that this problem can be treated.  Types of Incontinence  There are different types of incontinence. Two of the main types are described here. You can have more than one type.    Stress incontinence. With this type, urine leaks when pressure (stress) is put on the bladder. This may happen when you cough, sneeze, or laugh. Stress incontinence most often occurs because the pelvic floor muscles that support the bladder and urethra are weak. This can happen after pregnancy and vaginal childbirth or a hysterectomy. It can also be due to excess body weight or hormone changes.    Urge incontinence (also called overactive bladder). With this type, a sudden urge to  urinate is felt often. This may happen even though there may not be much urine in the bladder. The need to urinate often during the night is common. Urge incontinence most often occurs because of bladder spasms. This may be due to bladder irritation or infection. Damage to bladder nerves or pelvic muscles, constipation, and certain medicines can also lead to urge incontinence.  Treatment of urinary incontinence depends on the cause. Further evaluation is needed to find the type you have. This will likely include an exam and certain tests. Based on the results, you and your healthcare provider can then plan treatment. Until a diagnosis is made, the home care tips below can help relieve symptoms.  Home care    Do pelvic floor muscle exercises, if they are prescribed. The pelvic floor muscles help support the bladder and urethra. Many women find that their symptoms improve when doing special exercises that strengthen these muscles. To do the exercises contract the muscles you would use to stop your stream of urine, but do this when youre not urinating. Hold for 10 seconds, then relax. Repeat 10 to 20 times in a row, at least 3 times a day. Your provider may give you other instructions for how to do the exercises and how often.    Keep a bladder diary. This helps track how often and how much you urinate over a set period of time. Bring this diary with you to your next visit with the provider. The information can help your provider learn more about your bladder problem.    Lose weight, if advised to by your provider. Excess weight puts pressure on the bladder. Your provider can help you create a weight-loss plan thats right for you. This may include exercising more and making certain diet changes.    Don't consume foods and drinks that may irritate the bladder. These can include alcohol and caffeinated drinks.    Quit smoking. Smoking and other tobacco use can lead to chronic cough that strains the pelvic floor muscles.  Smoking may also damage the bladder and urethra. Talk with your provider about treatments or methods you can use to quit smoking.    If drinking large amounts of fluid causes you to have symptoms, you may be advised to limit your fluid intake. You may also be advised to drink most of your fluids during the day and to limit fluids at night.    If youre worried about urine leakage or accidents, you may wear absorbent pads to catch urine. Change the pads often. This helps reduce discomfort. It may also reduce the risk of skin or bladder infections.  Follow-up care  Follow up with your healthcare provider, or as directed. It may take some to find the right treatment for your problem. Your treatment plan may include special therapies or medicines. Certain procedures or surgery may also be options. Be sure to discuss any questions you have with your provider.  When to seek medical advice  Call the healthcare provider right away if any of these occur:    Fever of 100.4 F (38 C) or higher, or as directed by your provider    Bladder pain or fullness    Abdominal swelling    Nausea or vomiting    Back pain    Weakness, dizziness or fainting  Date Last Reviewed: 10/1/2017    4193-0277 [employer]. [address] [place] [address]. All rights reserved. This information is not intended as a substitute for professional medical care. Always follow your healthcare professional's instructions.     Patient Education   Understanding Advance Care Planning  Advance care planning is the process of deciding ones own future medical care. It helps ensure that if you cant speak for yourself, your wishes can still be carried out. The plan is a series of legal documents that note a persons wishes. The documents vary by state. Advance care planning may be done when a person has a serious illness that is expected to get worse. It may be done before major surgery. And it can help you and your family be prepared in case of a  major illness or injury. Advance care planning helps with making decisions at these times.       A health care proxy is a person who acts as the voice of a patient when the patient cant speak for himself or herself. The name of this role varies by state. It may be called a Durable Medical Power of  or Durable Power of  for Healthcare. It may be called an agent, surrogate, or advocate. Or it may be called a representative or decision maker. It is an official duty that is identified by a legal document. The document also varies by state.    Why Is Advance Care Planning Important?  If a person communicates their healthcare wishes:    They will be given medical care that matches their values and goals.    Their family members will not be forced to make decisions in a crisis with no guidance.  Creating a Plan  Making an advance care plan is often done in 3 steps:    Thinking about ones wishes. To create an advance care plan, you should think about what kind of medical treatment you would want if you lose the ability to communicate. Are there any situations in which you would refuse or stop treatment? Are there therapies you would want or not want? And whom do you want to make decisions for you? There are many places to learn more about how to plan for your care. Ask your doctor or  for resources.    Picking a health care proxy. This means choosing a trusted person to speak for you only when you cant speak for yourself. When you cannot make medical decisions, your proxy makes sure the instructions in your advance care plan are followed. A proxy does not make decisions based on his or her own opinions. They must put aside those opinions and values if needed, and carry out your wishes.    Filling out the legal documents. There are several kinds of legal documents for advance care planning. Each one tells health care providers your wishes. The documents may vary by state. They must be signed and  may need to be witnessed or notarized. You can cancel or change them whenever you wish. Depending on your state, the documents may include a Healthcare Proxy form, Living Will, Durable Medical Power of , Advance Directive, or others.  The Familys Role  The best help a family can give is to support their loved ones wishes. Open and honest communication is vital. Family should express any concerns they have about the patients choices while the patient can still make decisions.    7474-2136 [employer]. [address] [place] [address]. All rights reserved. This information is not intended as a substitute for professional medical care. Always follow your healthcare professional's instructions.         Also, [facility] offers a free, downloadable health care directive that allows you to share your treatment choices and personal preferences if you cannot communicate your wishes. It also allows you to appoint another person (called a health care agent) to make health care decisions if you are unable to do so. You can download an advance directive by going here: [email or web address]     Patient Education   Personalized Prevention Plan  You are due for the preventive services outlined below.  Your care team is available to assist you in scheduling these services.  If you have already completed any of these items, please share that information with your care team to update in your medical record.  Health Maintenance Due   Topic Date Due   ? HEPATITIS C SCREENING  Never done   ? ZOSTER VACCINES (2 of 3) 10/05/2015   ? Pneumococcal Vaccine: 65+ Years (2 of 2 - PPSV23) 08/17/2017   ? TD 18+ HE  04/16/2018   ? COVID-19 Vaccine (2 - Moderna 2-dose series) 04/04/2021

## 2021-06-20 NOTE — LETTER
Letter by Jennifer Hankins MD at      Author: Jennifer Hankins MD Service: -- Author Type: --    Filed:  Encounter Date: 1/22/2020 Status: (Other)         June Quintero  [address]  [place] [address]             January 22, 2020         Dear Ms. Quintero,    Below are the results from your recent visit:    Resulted Orders   Comprehensive Metabolic Panel   Result Value Ref Range    Sodium 138 136 - 145 mmol/L    Potassium 4.4 3.5 - 5.0 mmol/L    Chloride 101 98 - 107 mmol/L    CO2 27 22 - 31 mmol/L    Anion Gap, Calculation 10 5 - 18 mmol/L    Glucose 139 (H) 70 - 125 mg/dL    BUN 13 8 - 22 mg/dL    Creatinine 0.87 0.60 - 1.10 mg/dL    GFR MDRD Af Amer >60 >60 mL/min/1.73m2    GFR MDRD Non Af Amer >60 >60 mL/min/1.73m2    Bilirubin, Total 0.8 0.0 - 1.0 mg/dL    Calcium 10.0 8.5 - 10.5 mg/dL    Protein, Total 7.6 6.0 - 8.0 g/dL    Albumin 4.1 3.5 - 5.0 g/dL    Alkaline Phosphatase 108 45 - 120 U/L    AST 34 0 - 40 U/L    ALT 35 0 - 45 U/L    Narrative    Fasting Glucose reference range is 70-99 mg/dL per  American Diabetes Association (ADA) guidelines.   HM2(CBC w/o Differential)   Result Value Ref Range    WBC 6.7 4.0 - 11.0 thou/uL    RBC 3.62 (L) 3.80 - 5.40 mill/uL    Hemoglobin 12.4 12.0 - 16.0 g/dL    Hematocrit 38.3 35.0 - 47.0 %     (H) 80 - 100 fL    MCH 34.1 (H) 27.0 - 34.0 pg    MCHC 32.3 32.0 - 36.0 g/dL    RDW 10.1 (L) 11.0 - 14.5 %    Platelets 215 140 - 440 thou/uL    MPV 7.8 7.0 - 10.0 fL   Lipid Cascade FASTING   Result Value Ref Range    Cholesterol 210 (H) <=199 mg/dL    Triglycerides 169 (H) <=149 mg/dL    HDL Cholesterol 85 >=50 mg/dL    LDL Calculated 91 <=129 mg/dL    Patient Fasting > 8hrs? Yes    Glycosylated Hemoglobin A1c   Result Value Ref Range    Hemoglobin A1c 5.7 3.5 - 6.0 %       Your labs look fine.  The cholesterol is in a good range, the kidney function and blood counts are normal.  The blood sugar is a bit elevated, but your A1c looks fine.   Be aware of eating lower carbohydrates (bread, rice, pasta) and continue to get some regular exercise.  Plan recheck in 1 year.    Please call with questions or contact us using [employer].    Sincerely,        Electronically signed by Jennifer Hankins MD

## 2021-06-21 NOTE — PROGRESS NOTES
Assessment and Plan:     1. Routine general medical examination at a health care facility  Routine Medicare wellness visit, updated in EMR.  Patient will check with her insurance about tetanus booster and shingles vaccine.  She wishes to get her pneumonia vaccine at a pharmacy.  Fasting labs updated as below.  Pap smear no longer indicated, mammogram scheduled for December.  See below for problem specific plans.  Plan repeat physical in 1 year.    2. Adjustment disorder with anxious mood  Patient declines referral for therapy at this point.  She thinks her anxiety will improve with improvements in her 's health and decrease in frequency of MD visits.  Advised follow-up as needed for this.    3. Hypertension  Blood pressure well within goal range on current doses of metoprolol and losartan.  Recommended cutting back on losartan to 25 mg with recheck of her blood pressure at a nurse only visit in 2-4 weeks.  - Basic Metabolic Panel  - HM2(CBC w/o Differential)  - Lipid Cascade FASTING    4. Hypercholesteremia  Patient tolerates low-dose of simvastatin well.  Lipid profile updated today.  Higher doses of simvastatin make her dizzy she states.  - simvastatin (ZOCOR) 10 MG tablet; Take 1 tablet (10 mg total) by mouth at bedtime.  Dispense: 90 tablet; Refill: 3  - Lipid Toponas FASTING    5. Osteoporosis without current pathological fracture, unspecified osteoporosis type  Patient is due for follow-up bone density.  She has been receiving Prolia for 2 years.  - DXA Bone Density Scan; Future    6. Impaired fasting glucose  Patient continues with mildly elevated fasting glucose.  Discussed healthy, low carb, low sugar diet and trying to get some regular cardiovascular exercise.  - Basic Metabolic Panel    7. Non-seasonal allergic rhinitis, unspecified trigger  Patient will take Benadryl periodically for this.  Discussed use of nondrowsy antihistamine instead.  She has undergone allergy injections in the past which  "seemed to help she states.      The patient's current medical problems were reviewed.    I have had an Advance Directives discussion with the patient.  The following health maintenance schedule was reviewed with the patient and provided in printed form in the after visit summary:   Health Maintenance   Topic Date Due     PNEUMOCOCCAL POLYSACCHARIDE VACCINE AGE 65 AND OVER  2016     TD 18+ HE  2018     FALL RISK ASSESSMENT  10/10/2018     DXA SCAN  10/19/2018     FECAL OCCULT BLOOD/FIT ANNUAL COLON CANCER SCREENING  10/17/2019     MAMMOGRAM  2019     ADVANCE DIRECTIVES DISCUSSED WITH PATIENT  2021     INFLUENZA VACCINE RULE BASED  Completed     PNEUMOCOCCAL CONJUGATE VACCINE FOR ADULTS (PCV13 OR PREVNAR)  Completed     ZOSTER VACCINE  Completed        Subjective:   Chief Complaint: June Quintero is an 68 y.o. female here for an Annual Wellness visit.     HPI: Patient states she has been feeling \"okay\", she reports significant stress related to her 's failing health.  He has very frequent doctor visits which causes her stress as well.  She has not been focusing on her own health.  Overall she feels well with the exception of some non-seasonal allergy symptoms.  She takes Benadryl periodically for this.  She has no other specific questions or concerns today.    Review of Systems:  Please see above.  The rest of the complete review of systems are negative for all systems.    Patient Care Team:  Sarah Briones MD as PCP - General     Patient Active Problem List   Diagnosis     Hypercholesteremia     Hypertension     Osteoporosis     Allergic rhinitis     Impaired Fasting Glucose     History reviewed. No pertinent past medical history.   History reviewed. No pertinent surgical history.   Family History   Problem Relation Age of Onset     Breast cancer Sister 60     fraternal twin     Dementia Mother       age 82     Heart disease Father       age 60- had a hole in heart     " Parkinsonism Brother      Diabetes Sister       Social History     Social History     Marital status:      Spouse name: N/A     Number of children: N/A     Years of education: N/A     Occupational History     Not on file.     Social History Main Topics     Smoking status: Never Smoker     Smokeless tobacco: Never Used     Alcohol use Not on file     Drug use: Not on file     Sexual activity: Not on file     Other Topics Concern     Not on file     Social History Narrative    She is . They do not have children. 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 She does not smoke cigarettes or drink alcohol.      Current Outpatient Prescriptions   Medication Sig Dispense Refill     calcium-vitamin D (CALCIUM-VITAMIN D) 500 mg(1,250mg) -200 unit per tablet Take 2 tablets by mouth daily.       cyanocobalamin (VITAMIN B-12) 1000 MCG tablet Take 1,000 mcg by mouth daily.       cyclobenzaprine (FLEXERIL) 5 MG tablet Take 1 tablet (5 mg total) by mouth 3 (three) times a day as needed for muscle spasms. 30 tablet 0     famotidine (PEPCID) 20 MG tablet Take 20 mg by mouth 2 (two) times a day.       ketotifen (ZADITOR/ZYRTEC ITCHY EYES) 0.025 % ophthalmic solution 1 drop 2 (two) times a day.       metoprolol succinate (TOPROL-XL) 50 MG 24 hr tablet Take 1 tablet (50 mg total) by mouth 2 (two) times a day. 180 tablet 0     MULTIVITAMIN ORAL Take by mouth.       simvastatin (ZOCOR) 10 MG tablet Take 1 tablet (10 mg total) by mouth at bedtime. 90 tablet 3     timolol (BETIMOL) 0.5 % ophthalmic solution 1 drop 2 (two) times a day.       vitamin E 400 UNIT capsule Take 400 Units by mouth daily.       losartan (COZAAR) 25 MG tablet Take 1 tablet (25 mg total) by mouth daily. 90 tablet 3     Current Facility-Administered Medications   Medication Dose Route Frequency Provider Last Rate Last Dose     denosumab 60 mg (PROLIA 60 mg/ml)  60 mg Subcutaneous Q6 Months  "April MARISOL Briones MD   60 mg at 07/20/18 0958      Objective:   Vital Signs:   Visit Vitals     /62 (Patient Site: Right Arm, Patient Position: Sitting, Cuff Size: Adult Regular)     Pulse 64     Temp 98  F (36.7  C) (Oral)     Resp 16     Ht 4' 8.5\" (1.435 m)     Wt 130 lb 12 oz (59.3 kg)     LMP 10/14/1994     Breastfeeding No     BMI 28.8 kg/m2        VisionScreening:  No exam data present     PHYSICAL EXAM  /62 (Patient Site: Right Arm, Patient Position: Sitting, Cuff Size: Adult Regular)  Pulse 64  Temp 98  F (36.7  C) (Oral)   Resp 16  Ht 4' 8.5\" (1.435 m)  Wt 130 lb 12 oz (59.3 kg)  LMP 10/14/1994  Breastfeeding? No  BMI 28.8 kg/m2    General Appearance:    Alert, cooperative, no distress, appears stated age   Head:    Normocephalic, without obvious abnormality, atraumatic   Eyes:    PERRL, conjunctiva/corneas clear, EOM's intact both eyes   Ears:    Normal TM's and external ear canals, both ears   Nose:   Nares normal, septum midline, mucosa normal, no drainage     or sinus tenderness   Throat:   Lips, mucosa, and tongue normal; teeth and gums normal   Neck:   Supple, symmetrical, trachea midline, no adenopathy;     thyroid:  no enlargement/tenderness/nodules; no carotid    bruit or JVD   Back:     Symmetric, no curvature, ROM normal, no CVA tenderness   Lungs:     Clear to auscultation bilaterally, respirations unlabored   Chest Wall:    No tenderness or deformity    Heart:    Regular rate and rhythm, S1 and S2 normal, no murmur, rub    or gallop   Breast Exam:    No tenderness, masses, or nipple abnormality   Abdomen:     Soft, non-tender, bowel sounds active all four quadrants,     no masses, no organomegaly   Genitalia:    Not examined   Rectal:    Not examined   Extremities:   Extremities normal, atraumatic, no cyanosis or edema   Pulses:   2+ and symmetric all extremities   Skin:   Skin color, texture, turgor normal, no rashes or lesions   Lymph nodes:   Cervical, supraclavicular, " and axillary nodes normal   Neurologic:   CNII-XII intact, normal strength, sensation and reflexes     throughout       Assessment Results 10/29/2018   Activities of Daily Living No help needed   Instrumental Activities of Daily Living No help needed   Mini Cog Total Score 4   Some recent data might be hidden     A Mini-Cog score of 0-2 suggests the possibility of dementia, score of 3-5 suggests no dementia    Identified Health Risks:     The patient reports that she drinks more than one alcoholic drink per day but denies binge or excessive drinking. She was counseled and given information about possible harmful effects of excessive alcohol intake.  The patient's ROJAS-7 score is consistent with probably anxiety disorder.  She was provided with patient information regarding anxiety and was advised to set up a follow up appointment as needed to further address this issue.  Information regarding advance directives (living eckert), including where she can download the appropriate form, was provided to the patient via the AVS.

## 2021-06-21 NOTE — LETTER
Letter by Jennifer Hankins MD at      Author: Jennifer Hankins MD Service: -- Author Type: --    Filed:  Encounter Date: 3/31/2021 Status: (Other)         June Quintero  [address] [place] [address]             March 31, 2021         Dear Ms. Quintero,    Below are the results from your recent visit:    Resulted Orders   Comprehensive Metabolic Panel   Result Value Ref Range    Sodium 139 136 - 145 mmol/L    Potassium 4.3 3.5 - 5.0 mmol/L    Chloride 102 98 - 107 mmol/L    CO2 25 22 - 31 mmol/L    Anion Gap, Calculation 12 5 - 18 mmol/L    Glucose 126 (H) 70 - 125 mg/dL    BUN 14 8 - 28 mg/dL    Creatinine 0.85 0.60 - 1.10 mg/dL    GFR MDRD Af Amer >60 >60 mL/min/1.73m2    GFR MDRD Non Af Amer >60 >60 mL/min/1.73m2    Bilirubin, Total 0.6 0.0 - 1.0 mg/dL    Calcium 10.0 8.5 - 10.5 mg/dL    Protein, Total 7.8 6.0 - 8.0 g/dL    Albumin 4.3 3.5 - 5.0 g/dL    Alkaline Phosphatase 123 (H) 45 - 120 U/L    AST 41 (H) 0 - 40 U/L    ALT 41 0 - 45 U/L    Narrative    Fasting Glucose reference range is 70-99 mg/dL per  American Diabetes Association (ADA) guidelines.   HM2(CBC w/o Differential)   Result Value Ref Range    WBC 7.5 4.0 - 11.0 thou/uL    RBC 3.62 (L) 3.80 - 5.40 mill/uL    Hemoglobin 12.5 12.0 - 16.0 g/dL    Hematocrit 37.4 35.0 - 47.0 %     (H) 80 - 100 fL    MCH 34.5 (H) 27.0 - 34.0 pg    MCHC 33.4 32.0 - 36.0 g/dL    RDW 12.1 11.0 - 14.5 %    Platelets 219 140 - 440 thou/uL    MPV 9.9 7.0 - 10.0 fL   Glycosylated Hemoglobin A1c   Result Value Ref Range    Hemoglobin A1c 5.5 <=5.6 %   Lipid Cascade FASTING   Result Value Ref Range    Cholesterol 206 (H) <=199 mg/dL    Triglycerides 180 (H) <=149 mg/dL    HDL Cholesterol 79 >=50 mg/dL    LDL Calculated 91 <=129 mg/dL    Patient Fasting > 8hrs? No        Your kidney function is normal, blood sugar is a bit elevated but A1c looks good, so no evidence of diabetes at this point.  The cholesterol looks okay, I would not change  the dose of simvastatin.  The triglycerides are elevated, which is related to beer drinking.  Your liver enzymes are elevated as well, and hemoglobin is decreasing.  With excess alcohol intake, the liver is unable to produce red blood cells as it normally would.  I would recommend completely abstaining from alcohol if possible.  You could have an occasional drink on special occasions.  I would recheck the liver enzymes and blood counts in 6 months.    Please call with questions or contact us using [employer].    Sincerely,        Electronically signed by Jennifer Hankins MD

## 2021-06-22 NOTE — TELEPHONE ENCOUNTER
Pt is coming in 1/17/19 for osteo follow up and prolia     Current diagnosis: M81.0  Last Prolia Injection Date: New  Requested action for Chelsea:  Insurance verification and schedule appt

## 2021-06-22 NOTE — TELEPHONE ENCOUNTER
I called Cristy and confirmed her member # is [id number] and group is [id number]. I confirmed a PA is needed. Direct # is [phone]. Sent an email to Madonna Hyatt for a PA.    Note: Cristy said pt resp is 20%, no co pay or deduct. She has a $3K OOP ($0 met).      Left  for pt with coverage.

## 2021-06-23 NOTE — TELEPHONE ENCOUNTER
Former patient of Anselmo & has not established care with another provider.  Please assign refill request to covering provider per Clinic standard process.      RN cannot approve Refill Request    RN can NOT refill this medication PCP messaged that patient is overdue for Office Visit.     Ashli Clarke, [facility] Triage/Med Refill 1/15/2019    Requested Prescriptions   Pending Prescriptions Disp Refills     metoprolol succinate (TOPROL-XL) 50 MG 24 hr tablet 180 tablet 3     Sig: Take 1 tablet (50 mg total) by mouth 2 (two) times a day.    Beta-Blockers Refill Protocol Failed - 1/14/2019  3:20 PM       Failed - PCP or prescribing provider visit in past 12 months or next 3 months    Last office visit with prescriber/PCP: 10/10/2017 Sarah Briones MD OR same dept: Visit date not found OR same specialty: 10/10/2017 Sarah Briones MD  Last physical: Visit date not found Last MTM visit: Visit date not found   Next visit within 3 mo: Visit date not found  Next physical within 3 mo: Visit date not found  Prescriber OR PCP: Sarah Briones MD  Last diagnosis associated with med order: 1. Essential hypertension with goal blood pressure less than 140/90  - metoprolol succinate (TOPROL-XL) 50 MG 24 hr tablet; Take 1 tablet (50 mg total) by mouth 2 (two) times a day.  Dispense: 180 tablet; Refill: 3    If protocol passes may refill for 12 months if within 3 months of last provider visit (or a total of 15 months).            Passed - Blood pressure filed in past 12 months    BP Readings from Last 1 Encounters:   12/04/18 132/64

## 2021-06-23 NOTE — PROGRESS NOTES
[facility] Follow Up Note    Assessment/Plan:  1. Osteoporosis without current pathological fracture, unspecified osteoporosis type  Diagnosed in 2011 (was treated with 1 year of bisphosphonate therapy) , later treated with Prolia (2016).  She has had a nice response to Prolia injection and is here for an injection.  No intolerances noted.  She denies dermatitis, frequent respiratory infections, jaw problems etc.  She is supplementing with calcium and vitamin D regularly.  She has a certification in nutrition and states that she has worked very carefully to manage her supplements.  She walks and does exercises on a regular basis.  Recommendations: In addition to Prolia,  balance exercises are recommended.  Additionally, would like to check a periodic vitamin D to make sure that this is optimal.  Finally, have discussed duration of treatment.  If Prolia is discontinued, there is precipitous loss of bone density that was gained.  Additionally, there is a possibility for rebound vertebral fractures.  Therefore, cessation of this medication must be carefully planned.  She is apprised of this.  In 2 years, we could consider switching her to bisphosphonate therapy.  Of note, she states she used this remotely in 2013 but had some nausea.  Additionally, she is planning a dental procedure sometime in the future.  She has some front teeth that need to be extracted.  She is advised today that this needs to be carefully planned.  Her dentist needs to be aware of Prolia usage.  Ideally she would be off the medication for 6-8 weeks prior to the extractions.  Once healed, therapy would be resumed.  This was written out for her and she understands.  The osteoporosis literature was provided for her today.  - Vitamin D, Total (25-Hydroxy); Future      Follow-up with injections as she has been doing every 6 months.  Bone density in 2 years.  Follow-up office visit with me in 2 years.  She should contact me if she needs  guidance with regard to Prolia and dental surgical procedures    Patient was educated on safety of Prolia utilizing Patient Counseling Chart for Healthcare Providers, as outlined by the Prolia REMS progam.     Marielos Jean MD    Chief Complaint:  Chief Complaint   Patient presents with     Osteoporosis Consult       History of Present Illness:  June is a 68 y.o. female who was previously under the care of Dr. Sarah Briones.  Now she is under the care of Dr. Jennifer Hankins.  In general, she enjoys fairly good health.  Her chart in medical history are reviewed.  Of note, she has a history of osteoporosis.  The first available bone densitometry for review was in 2011.  She had osteoporosis at that time.  It appears that in 2013 she was treated for 1 year with alendronate.  She has some nausea with this.  She has currently been receiving Prolia therapy since 2016.  She denies any untoward reactions to this.  She is very happy with her plan.  She continues to supplement with calcium and vitamin D.  She had an optimal vitamin D level in October 2017.    Osteoporosis care questionnaire:  Social history: She is .  She has no children.  She has worked as a homemaker.  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  She currently lives independently.  Lifestyle: She is never been a smoker.  She enjoys walking.  She gets sunlight exposure in the summertime.  Dietary history reviewed: She gets very little milk but eats 3 servings of cheese per week.  She has green vegetables daily.  She does not drink caffeine.  She does not use excessive salt.  Past medical history: Is negative for fractures, and positive for high blood pressure.  She has had no surgeries.  Family history reviewed: Her mother had osteoporosis.  Her twin sister had breast cancer.  High risk medications are reviewed.  She has not had any chronic high risk medications.  Menopause date patient is  unsure.  Onset of menses 11 years of age.    Bone density:  Assessment:     1. The spine bone density L1-L4 with T-score -0.3.  2. Femoral bone densities show left femoral neck T- score -2.3 and right femoral neck T-score -1.3.  3. Trabecular bone score indicates good trabecular bone architecture.        68 y.o. female with LOW BONE DENSITY (OSTEOPENIA) currently receiving treatment with Prolia.       Review of Systems:  A comprehensive review of systems was performed and was otherwise negative    PFSH:  Social History: She is  and a homemaker.  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  Her  is currently at [facility] with major respiratory compromise.  Social History     Tobacco Use   Smoking Status Never Smoker   Smokeless Tobacco Never Used       Past History: Chart is reviewed  Current Outpatient Medications   Medication Sig Dispense Refill     calcium-vitamin D (CALCIUM-VITAMIN D) 500 mg(1,250mg) -200 unit per tablet Take 2 tablets by mouth daily.       cyanocobalamin (VITAMIN B-12) 1000 MCG tablet Take 1,000 mcg by mouth daily.       cyclobenzaprine (FLEXERIL) 5 MG tablet Take 1 tablet (5 mg total) by mouth 3 (three) times a day as needed for muscle spasms. 30 tablet 0     famotidine (PEPCID) 20 MG tablet Take 20 mg by mouth 2 (two) times a day.       ketotifen (ZADITOR/ZYRTEC ITCHY EYES) 0.025 % ophthalmic solution 1 drop 2 (two) times a day.       losartan (COZAAR) 25 MG tablet Take 1 tablet (25 mg total) by mouth daily. 90 tablet 3     metoprolol succinate (TOPROL-XL) 50 MG 24 hr tablet Take 1 tablet (50 mg total) by mouth 2 (two) times a day. 180 tablet 3     MULTIVITAMIN ORAL Take by mouth.       simvastatin (ZOCOR) 10 MG tablet Take 1 tablet (10 mg total) by mouth at bedtime. 90 tablet 3     timolol (BETIMOL) 0.5 % ophthalmic solution 1 drop 2 (two) times a day.       vitamin E 400 UNIT capsule Take 400 Units by mouth daily.       Current Facility-Administered Medications  "  Medication Dose Route Frequency Provider Last Rate Last Dose     denosumab 60 mg (PROLIA 60 mg/ml)  60 mg Subcutaneous Q6 Months Sarah Briones MD   60 mg at 01/17/19 1050       Family History: Mother had osteoporosis    Physical Exam:  General Appearance:   He is pleasant and well-appearing and in no acute distress  Vitals:    01/17/19 1047   BP: 134/64   Patient Site: Left Arm   Patient Position: Sitting   Cuff Size: Adult Regular   Pulse: 74   SpO2: 98%   Weight: 132 lb 9 oz (60.1 kg)   Height: 4' 9\" (1.448 m)     Wt Readings from Last 3 Encounters:   01/17/19 132 lb 9 oz (60.1 kg)   10/29/18 130 lb 12 oz (59.3 kg)   10/10/17 132 lb (59.9 kg)     Body mass index is 28.69 kg/m .    Gait and balance seem stable.  Oral cavity is examined.  The 4 lower front teeth are carious.    Data Review:    Analysis and Summary of Old Records (2): Reviewed previous clinical records    Records Requested (1):       Other History Summarized (from other people in the room) (2):     Radiology Tests Summarized (XRAY/CT/MRI/DXA) (1): Reviewed bone densities    Labs Reviewed (1): Reviewed labs    Medicine Tests Reviewed (EKG/ECHO/COLONOSCOPY/EGD) (1):     Independent Review of EKG or X-RAY (2):         "

## 2021-06-23 NOTE — TELEPHONE ENCOUNTER
Former patient of Sarah Briones MD & has not established care with another provider.  Please assign refill request to covering provider per Clinic standard process.      RN cannot approve Refill Request    RN can NOT refill this medication Protocol failed and NO refill given. Last office visit: 9/28/2017 Simon Jon MD Last Physical: Visit date not found Last MTM visit: Visit date not found Last visit same specialty: 10/10/2017 Sarah Briones MD.  Next visit within 3 mo: Visit date not found  Next physical within 3 mo: Visit date not found      Nichole Duke, [facility] Triage/Med Refill 1/11/2019    Requested Prescriptions   Pending Prescriptions Disp Refills     metoprolol succinate (TOPROL-XL) 50 MG 24 hr tablet [Pharmacy Med Name: METOPROLOL SUCCINATE ER 50 MG Tablet Extended Release 24 Hour] 180 tablet 0     Sig: TAKE 1 TABLET TWICE DAILY    Beta-Blockers Refill Protocol Failed - 1/10/2019  9:56 PM       Failed - PCP or prescribing provider visit in past 12 months or next 3 months    Last office visit with prescriber/PCP: 9/28/2017 Simon Jon MD OR same dept: Visit date not found OR same specialty: 10/10/2017 Sarah Briones MD  Last physical: Visit date not found Last MTM visit: Visit date not found   Next visit within 3 mo: Visit date not found  Next physical within 3 mo: Visit date not found  Prescriber OR PCP: Simon Jon MD  Last diagnosis associated with med order: 1. Essential hypertension with goal blood pressure less than 140/90  - metoprolol succinate (TOPROL-XL) 50 MG 24 hr tablet [Pharmacy Med Name: METOPROLOL SUCCINATE ER 50 MG Tablet Extended Release 24 Hour]; TAKE 1 TABLET TWICE DAILY  Dispense: 180 tablet; Refill: 0    If protocol passes may refill for 12 months if within 3 months of last provider visit (or a total of 15 months).            Passed - Blood pressure filed in past 12 months    BP Readings from Last 1 Encounters:   12/04/18  132/64

## 2021-06-23 NOTE — TELEPHONE ENCOUNTER
Prolia Given: 7/20/18 @ [facility]  Next Inj: 1/17/19 @ [facility]  Ins verified, to scan. Pt notified. 1/16/19 jcl  2019 per call to [employer] no Prior Auth required for Prolia/jk  Per call to [employer] (Nikki SAENZ) ok to give inj 3 days before 6 months./ jcl      Checking with Madonna SAENZ on status of PA request.  __________  Per Prolia process, cutting and pastin old Spec Comments below and updated fields with current info:    Last inj: 7/11/17 @ [facility]  Next inj: 1/16/18 @ [facility]  Ins verified, cov 100%. To scan on 12/5/17. jcl

## 2021-06-23 NOTE — TELEPHONE ENCOUNTER
Ins verified, to scan. Pt notified. 1/16/19 jcl  2019 per call to [employer] no Prior Auth required for Prolia/jk

## 2021-07-03 NOTE — ADDENDUM NOTE
Addendum Note by Jennifer Hankins MD at 1/21/2020 10:00 AM     Author: Jennifer Hankins MD Service: -- Author Type: Physician    Filed: 1/22/2020  8:16 AM Encounter Date: 1/21/2020 Status: Signed    : Jennifer Hankins MD (Physician)    Addended by: JENNIFER HANKINS on: 1/22/2020 08:16 AM        Modules accepted: Orders

## 2021-07-04 NOTE — LETTER
Letter by Jennifer Hankins MD at      Author: Jennifer Hankins MD Service: -- Author Type: --    Filed:  Encounter Date: 6/11/2021 Status: (Other)         June Quintero  [address] [place] [address]             June 11, 2021         Dear Ms. Quintero,    Below are the results from your recent visit:    Resulted Orders   DXA Bone Density Scan    Narrative    6/10/2021      RE: June Quintero  YOB: 1950        Dear Jennifer Hankins,    Patient Profile:  71 y.o. female, postmenopausal, is here for the follow up bone density   test.   History of fractures - None. Family history of osteoporosis - Yes;    mother.  Family history of hip fracture: None. Smoking history - No.   Osteoporosis treatment past -  Yes;  Bisphosphonates and Prolia.   Osteoporosis treatment current - No.  Chronic medical problems - Chronic   low back problems. High risk medications -  Steroids;  Yes, in the Past.      Assessment:    1. The spine bone density L2-L4 with T-score -0.3.  2. Femoral bone densities show left femoral neck T- score  is -2.2 and   right femoral neck T-score -1.8.  3. Trabecular bone score indicates good trabecular bone architecture.      71 y.o. female with LOW BONE DENSITY (OSTEOPENIA) and MODERATE fracture   risk, adjusted for the TBS, with major osteoporotic fracture risk 11.6%   and hip fracture risk 2.5%.     Since the previous bone density dated December 5, 2018, there has been to   statistically significant % change in the bone density of the spine and   hips bilaterally.     Recommendations:  Appropriate calcium, vitamin D supplements, along with balance and weight   bearing exercise recommended with follow up bone density scan in 2 years.      Bone densitometry was performed on your patient using our OMsignal   densitometer. The results are summarized and a copy of the actual scans   are included for your review. In conformity with the International Society    of Clinical Densitometry's most recent position statement for DXA   interpretation (2015), the diagnosis will be made on the lowest measured   T-score of the lumbar spine, femoral neck, total proximal femur or 33%   radius. Note the change in terminology for diagnostic classification from   OSTEOPENIA to LOW BONE MASS. All trending for sequential exams will be   done using multiple vertebrae or the total proximal femur. Fracture risk   is based on the WHO Fracture Risk Assessment Tool (FRAX). If additional   information is needed or if you would like to discuss the results, please   do not hesitate to call me.       Thank you for referring this patient to [facility]. We are happy to be of service in support of you and your   practice. If you have any questions or suggestions to improve our service,   please call me at [phone].     Sincerely,     Marielos Jean M.D. C.C.D.  [facility]       Your bone density continues to be slightly low, but no osteoporosis.  Continue with calcium and D along with weight-bearing exercise like walking.  Plan recheck in about 2 years.    Please call with questions or contact us using [place].    Sincerely,        Electronically signed by Jennifer Hankins MD

## 2021-07-13 ENCOUNTER — RECORDS - HEALTHEAST (OUTPATIENT)
Dept: ADMINISTRATIVE | Facility: CLINIC | Age: 71
End: 2021-07-13

## 2021-07-21 ENCOUNTER — RECORDS - HEALTHEAST (OUTPATIENT)
Dept: ADMINISTRATIVE | Facility: CLINIC | Age: 71
End: 2021-07-21

## 2021-07-22 ENCOUNTER — RECORDS - HEALTHEAST (OUTPATIENT)
Dept: INTERNAL MEDICINE | Facility: CLINIC | Age: 71
End: 2021-07-22

## 2021-07-22 DIAGNOSIS — Z12.31 OTHER SCREENING MAMMOGRAM: ICD-10-CM

## 2021-08-05 ENCOUNTER — TELEPHONE (OUTPATIENT)
Dept: FAMILY MEDICINE | Facility: CLINIC | Age: 71
End: 2021-08-05

## 2021-08-05 NOTE — TELEPHONE ENCOUNTER
Patient is calling to see if she has received the vaccine for whooping cough.  She will be helping take care of her niece's newborns within the next couple weeks.  If patient needs the whooping cough vaccine, she would like to get any other vaccines at the same time.  Please call patient at [phone] and let her know if she is due.  Ok to leave detailed message if she does not answer.

## 2021-08-09 NOTE — TELEPHONE ENCOUNTER
Patient is calling to follow up. Pt wants to know if she needs any other vaccines. Patient states her niece told her she needs the whooping cough shot. Communicated to patient I will send the message to the provider's team and we will reach back out to her. She states understanding and will wait for our call.

## 2021-08-10 ENCOUNTER — ALLIED HEALTH/NURSE VISIT (OUTPATIENT)
Dept: FAMILY MEDICINE | Facility: CLINIC | Age: 71
End: 2021-08-10
Payer: COMMERCIAL

## 2021-08-10 DIAGNOSIS — Z23 NEED FOR VACCINATION: Primary | ICD-10-CM

## 2021-08-10 PROCEDURE — 90732 PPSV23 VACC 2 YRS+ SUBQ/IM: CPT

## 2021-08-10 PROCEDURE — 99207 PR NO CHARGE NURSE ONLY: CPT

## 2021-08-10 PROCEDURE — G0009 ADMIN PNEUMOCOCCAL VACCINE: HCPCS

## 2021-08-10 NOTE — TELEPHONE ENCOUNTER
Informed patient of Dr. Hankins's message below. Pt voiced understanding. Pt scheduled today at [facility] clinic for Pneumovax vaccine. Pt will get TDAP at pharmacy.

## 2021-08-10 NOTE — TELEPHONE ENCOUNTER
Yes, at her physical we talked about PPSV23, but she was in the middle of her COVID series.  I will order that also.  Please warn her that Medicare usually doesn't cover tetanus boosters, which is why we didn't discuss at her physical, but happy to order.

## 2021-09-01 ENCOUNTER — TRANSFERRED RECORDS (OUTPATIENT)
Dept: HEALTH INFORMATION MANAGEMENT | Facility: CLINIC | Age: 71
End: 2021-09-01

## 2022-03-02 ENCOUNTER — TRANSFERRED RECORDS (OUTPATIENT)
Dept: HEALTH INFORMATION MANAGEMENT | Facility: CLINIC | Age: 72
End: 2022-03-02
Payer: COMMERCIAL

## 2022-04-13 ENCOUNTER — OFFICE VISIT (OUTPATIENT)
Dept: FAMILY MEDICINE | Facility: CLINIC | Age: 72
End: 2022-04-13
Payer: COMMERCIAL

## 2022-04-13 VITALS
SYSTOLIC BLOOD PRESSURE: 128 MMHG | HEART RATE: 65 BPM | DIASTOLIC BLOOD PRESSURE: 86 MMHG | HEIGHT: 57 IN | OXYGEN SATURATION: 98 % | WEIGHT: 129 LBS | BODY MASS INDEX: 27.83 KG/M2

## 2022-04-13 DIAGNOSIS — Z00.00 ENCOUNTER FOR MEDICARE ANNUAL WELLNESS EXAM: Primary | ICD-10-CM

## 2022-04-13 DIAGNOSIS — M81.0 AGE-RELATED OSTEOPOROSIS WITHOUT CURRENT PATHOLOGICAL FRACTURE: ICD-10-CM

## 2022-04-13 DIAGNOSIS — R73.01 IMPAIRED FASTING GLUCOSE: ICD-10-CM

## 2022-04-13 DIAGNOSIS — F10.10 ALCOHOL USE DISORDER, MILD, ABUSE: ICD-10-CM

## 2022-04-13 DIAGNOSIS — E78.2 MIXED HYPERLIPIDEMIA: ICD-10-CM

## 2022-04-13 DIAGNOSIS — I10 ESSENTIAL HYPERTENSION, BENIGN: ICD-10-CM

## 2022-04-13 PROBLEM — M21.70 LEG LENGTH DISCREPANCY: Status: ACTIVE | Noted: 2021-03-31

## 2022-04-13 PROBLEM — F43.22 ADJUSTMENT DISORDER WITH ANXIOUS MOOD: Status: ACTIVE | Noted: 2018-10-30

## 2022-04-13 LAB
ALBUMIN UR-MCNC: NEGATIVE MG/DL
APPEARANCE UR: CLEAR
BILIRUB UR QL STRIP: NEGATIVE
COLOR UR AUTO: YELLOW
ERYTHROCYTE [DISTWIDTH] IN BLOOD BY AUTOMATED COUNT: 12.2 % (ref 10–15)
GLUCOSE UR STRIP-MCNC: NEGATIVE MG/DL
HCT VFR BLD AUTO: 38.6 % (ref 35–47)
HGB BLD-MCNC: 12.6 G/DL (ref 11.7–15.7)
HGB UR QL STRIP: NEGATIVE
KETONES UR STRIP-MCNC: NEGATIVE MG/DL
LEUKOCYTE ESTERASE UR QL STRIP: NEGATIVE
MCH RBC QN AUTO: 34.9 PG (ref 26.5–33)
MCHC RBC AUTO-ENTMCNC: 32.6 G/DL (ref 31.5–36.5)
MCV RBC AUTO: 107 FL (ref 78–100)
NITRATE UR QL: NEGATIVE
PH UR STRIP: 5 [PH] (ref 5–8)
PLATELET # BLD AUTO: 221 10E3/UL (ref 150–450)
RBC # BLD AUTO: 3.61 10E6/UL (ref 3.8–5.2)
SP GR UR STRIP: 1.01 (ref 1–1.03)
UROBILINOGEN UR STRIP-ACNC: 0.2 E.U./DL
WBC # BLD AUTO: 6.5 10E3/UL (ref 4–11)

## 2022-04-13 PROCEDURE — 99214 OFFICE O/P EST MOD 30 MIN: CPT | Mod: 25 | Performed by: FAMILY MEDICINE

## 2022-04-13 PROCEDURE — 80061 LIPID PANEL: CPT | Performed by: FAMILY MEDICINE

## 2022-04-13 PROCEDURE — 85027 COMPLETE CBC AUTOMATED: CPT | Performed by: FAMILY MEDICINE

## 2022-04-13 PROCEDURE — 99397 PER PM REEVAL EST PAT 65+ YR: CPT | Performed by: FAMILY MEDICINE

## 2022-04-13 PROCEDURE — 83036 HEMOGLOBIN GLYCOSYLATED A1C: CPT | Performed by: FAMILY MEDICINE

## 2022-04-13 PROCEDURE — 36415 COLL VENOUS BLD VENIPUNCTURE: CPT | Performed by: FAMILY MEDICINE

## 2022-04-13 PROCEDURE — 80053 COMPREHEN METABOLIC PANEL: CPT | Performed by: FAMILY MEDICINE

## 2022-04-13 PROCEDURE — 81003 URINALYSIS AUTO W/O SCOPE: CPT | Performed by: FAMILY MEDICINE

## 2022-04-13 RX ORDER — CYANOCOBALAMIN (VITAMIN B-12) 500 MCG
400 LOZENGE ORAL DAILY
COMMUNITY

## 2022-04-13 RX ORDER — METOPROLOL SUCCINATE 50 MG/1
50 TABLET, EXTENDED RELEASE ORAL 2 TIMES DAILY
Qty: 180 TABLET | Refills: 0
Start: 2022-04-13 | End: 2022-05-16

## 2022-04-13 RX ORDER — TIMOLOL MALEATE 5 MG/ML
1 SOLUTION/ DROPS OPHTHALMIC
COMMUNITY

## 2022-04-13 ASSESSMENT — ACTIVITIES OF DAILY LIVING (ADL): CURRENT_FUNCTION: NO ASSISTANCE NEEDED

## 2022-04-13 NOTE — PROGRESS NOTES
"SUBJECTIVE:   June Quintero is a 72 year old female who presents for Preventive Visit.    Patient has been advised of split billing requirements and indicates understanding: Yes     Are you in the first 12 months of your Medicare coverage?  No    Healthy Habits:     In general, how would you rate your overall health?  Excellent    Frequency of exercise:  6-7 days/week    Duration of exercise:  45-60 minutes    Do you usually eat at least 4 servings of fruit and vegetables a day, include whole grains    & fiber and avoid regularly eating high fat or \"junk\" foods?  Yes    Taking medications regularly:  Yes    Medication side effects:  Muscle aches    Ability to successfully perform activities of daily living:  No assistance needed    Home Safety:  No safety concerns identified    Hearing Impairment:  No hearing concerns    In the past 6 months, have you been bothered by leaking of urine? Yes    In general, how would you rate your overall mental or emotional health?  Good      PHQ-2 Total Score: 0    Additional concerns today:  Yes    Do you feel safe in your environment? Yes    Have you ever done Advance Care Planning? (For example, a Health Directive, POLST, or a discussion with a medical provider or your loved ones about your wishes): No, advance care planning information given to patient to review.  Patient declined advance care planning discussion at this time.    Fall risk  Fallen 2 or more times in the past year?: No  Any fall with injury in the past year?: No    Cognitive Screening   1) Repeat 3 items (Leader, Season, Table)    2) Clock draw: NORMAL  3) 3 item recall: Recalls 3 objects  Results: 3 items recalled: COGNITIVE IMPAIRMENT LESS LIKELY    Mini-CogTM Copyright VIDYA Farrar. Licensed by the author for use in [facility]; reprinted with permission (irma@.[facility]). All rights reserved.      Do you have sleep apnea, excessive snoring or daytime drowsiness?: yes    Reviewed and updated as needed " this visit by clinical staff   Tobacco  Allergies  Meds                Reviewed and updated as needed this visit by Provider   Tobacco  Allergies  Meds   Med Hx  Surg Hx  Fam Hx           Social History     Tobacco Use     Smoking status: Never Smoker     Smokeless tobacco: Never Used   Substance Use Topics     Alcohol use: Yes     Alcohol/week: 14.0 - 21.0 standard drinks     If you drink alcohol do you typically have >3 drinks per day or >7 drinks per week? Yes      Alcohol Use 4/13/2022   Prescreen: >3 drinks/day or >7 drinks/week? Yes   Prescreen: >3 drinks/day or >7 drinks/week? -   AUDIT SCORE  4     AUDIT - Alcohol Use Disorders Identification Test - Reproduced from the World Health Organization Audit 2001 (Second Edition) 4/13/2022   1.  How often do you have a drink containing alcohol? 4 or more times a week   2.  How many drinks containing alcohol do you have on a typical day when you are drinking? 1 or 2   3.  How often do you have five or more drinks on one occasion? Never   4.  How often during the last year have you found that you were not able to stop drinking once you had started? Never   5.  How often during the last year have you failed to do what was normally expected of you because of drinking? Never   6.  How often during the last year have you needed a first drink in the morning to get yourself going after a heavy drinking session? Never   7.  How often during the last year have you had a feeling of guilt or remorse after drinking? Never   8.  How often during the last year have you been unable to remember what happened the night before because of your drinking? Never   9.  Have you or someone else been injured because of your drinking? No   10. Has a relative, friend, doctor or other health care worker been concerned about your drinking or suggested you cut down? No   TOTAL SCORE 4       PROBLEMS TO ADD ON...  1.  Patient feels that her toenails need to be trimmed and she is having a  hard time doing this on her own.  She would like to see a podiatrist for this.  Recommended [facility] as our podiatrist do not do nail care and she lives close to [place].  2.  Patient continues to drink 14-21 alcoholic drinks per week, 2 to 3/day.  She states that she is trying to cut back but is under quite a bit of stress recently.  3.  Patient brings in a small sheet of paper on which she has written her daily steps for the past week.  She gets between 4000 and 7000 steps daily.  She is very active throughout her community, cleans her own house and someone else's house, and walks for exercise.  Congratulated her efforts.    Current providers sharing in care for this patient include:   Patient Care Team:  Jennifer Hankins MD as PCP - General  Jennifer Hankins MD as Assigned PCP    The following health maintenance items are reviewed in Epic and correct as of today:  Health Maintenance Due   Topic Date Due     ANNUAL REVIEW OF HM ORDERS  Never done     HEPATITIS C SCREENING  Never done     FALL RISK ASSESSMENT  03/31/2022     BP Readings from Last 3 Encounters:   04/13/22 128/86   03/31/21 136/82   01/21/20 130/78    Wt Readings from Last 3 Encounters:   04/13/22 58.5 kg (129 lb)   03/31/21 59.7 kg (131 lb 9.6 oz)   01/21/20 57.7 kg (127 lb 1.6 oz)                  Patient Active Problem List   Diagnosis     Acute gastric ulcer     Allergic rhinitis     Essential hypertension, benign     Hyperlipidemia     Osteoporosis     Health Care Home     Leg length discrepancy     Alcohol use disorder, mild, abuse     Adjustment disorder with anxious mood     No past surgical history on file.    Social History     Tobacco Use     Smoking status: Never Smoker     Smokeless tobacco: Never Used   Substance Use Topics     Alcohol use: Yes     Alcohol/week: 14.0 - 21.0 standard drinks     Family History   Problem Relation Age of Onset     Breast Cancer Sister 60.00        fraternal twin     Hearing Loss Sister  "     Dementia Mother          age 82     Heart Disease Father          age 60- had a hole in heart     Parkinsonism Brother      Hearing Loss Brother      Hearing Loss Brother      Diabetes Sister      Hearing Loss Sister      Colon Cancer No family hx of          Current Outpatient Medications   Medication Sig Dispense Refill     acetaminophen (TYLENOL) 500 MG tablet Take 500-1,000 mg by mouth every 6 hours as needed.       calcium carbonate 600 mg-vitamin D 400 units (CALTRATE) 600-400 MG-UNIT per tablet Take 1 tablet by mouth 2 times daily.       diphenhydrAMINE (BENADRYL) 12.5 MG/5ML elixir Take 5 ml daily for allergies       ketotifen (ZADITOR) 0.025 % ophthalmic solution 1 drop       losartan (COZAAR) 25 MG tablet TAKE 1 TABLET DAILY 90 tablet 0     metoprolol succinate ER (TOPROL-XL) 50 MG 24 hr tablet Take 1 tablet (50 mg) by mouth 2 times daily 180 tablet 0     multivitamin w/minerals (THERA-VIT-M) tablet Take 1 tablet by mouth daily.       simvastatin (ZOCOR) 10 MG tablet TAKE 1 TABLET AT BEDTIME 90 tablet 0     timolol maleate (TIMOPTIC) 0.5 % ophthalmic solution 1 drop       vitamin E 400 units TABS Take 400 Units by mouth daily       Allergies   Allergen Reactions     Ibuprofen Swelling     Throat swelling     Aspirin      Motrin Pm      Penicillins      Sulfa Drugs      Atorvastatin Nausea         Review of Systems  Constitutional, HEENT, cardiovascular, pulmonary, GI, , musculoskeletal, neuro, skin, endocrine and psych systems are negative, except as otherwise noted.    OBJECTIVE:   /86 (BP Location: Right arm, Patient Position: Sitting, Cuff Size: Adult Regular)   Pulse 65   Ht 1.448 m (4' 9\")   Wt 58.5 kg (129 lb)   SpO2 98%   BMI 27.92 kg/m   Estimated body mass index is 27.92 kg/m  as calculated from the following:    Height as of this encounter: 1.448 m (4' 9\").    Weight as of this encounter: 58.5 kg (129 lb).  Physical Exam  GENERAL APPEARANCE: healthy, alert and no " distress  EYES: Eyes grossly normal to inspection, PERRL and conjunctivae and sclerae normal  HENT: ear canals and TM's normal  NECK: no adenopathy, no asymmetry, masses, or scars and thyroid normal to palpation  RESP: lungs clear to auscultation - no rales, rhonchi or wheezes  BREAST: Refused  CV: regular rate and rhythm, normal S1 S2, no S3 or S4, no murmur, click or rub, no peripheral edema and peripheral pulses strong  ABDOMEN: soft, nontender, no hepatosplenomegaly, no masses and bowel sounds normal  MS: no musculoskeletal defects are noted and gait is age appropriate without ataxia  SKIN: no suspicious lesions or rashes  NEURO: Normal strength and tone, sensory exam grossly normal, mentation intact and speech normal  PSYCH: mentation appears normal and affect normal/bright    Diagnostic Test Results:  Labs reviewed in Epic    ASSESSMENT / PLAN:   1. Encounter for Medicare annual wellness exam  Routine Medicare wellness visit, updated in EMR.  Nonfasting labs updated as below.  Patient has mammogram scheduled next month.  She will be due for repeat Cologuard February of next year.  She will be due for updated bone density testing June of next year.  Immunizations are up-to-date.  Plan repeat physical in 1 year.  - CBC with platelets  - Comprehensive metabolic panel (BMP + Alb, Alk Phos, ALT, AST, Total. Bili, TP)  - Lipid panel reflex to direct LDL Fasting  - UA Macro with Reflex to Micro and Culture - lab collect    2. Essential hypertension, benign  Blood pressure is well controlled on metoprolol and losartan.  Labs updated as below.  - metoprolol succinate ER (TOPROL-XL) 50 MG 24 hr tablet; Take 1 tablet (50 mg) by mouth 2 times daily  Dispense: 180 tablet; Refill: 0  - CBC with platelets  - Comprehensive metabolic panel (BMP + Alb, Alk Phos, ALT, AST, Total. Bili, TP)  - Lipid panel reflex to direct LDL Fasting  - UA Macro with Reflex to Micro and Culture - lab collect    3. Mixed hyperlipidemia  Tolerating  "simvastatin well.  Labs updated as below.  - Comprehensive metabolic panel (BMP + Alb, Alk Phos, ALT, AST, Total. Bili, TP)  - Lipid panel reflex to direct LDL Fasting    4. Age-related osteoporosis without current pathological fracture  Patient continues on drug holiday from Prolia.  She previously followed with Dr. Jean for this.  She will be due for updated bone density testing in June 2023.  - Comprehensive metabolic panel (BMP + Alb, Alk Phos, ALT, AST, Total. Bili, TP)    5. Alcohol use disorder, mild, abuse  Discussed the importance of cessation of alcohol use for the health of her liver, reduction of heart disease risk, and reduction of cancer risk.  Patient verbalizes understanding and will continue to try to cut back on alcohol use.  - CBC with platelets  - Comprehensive metabolic panel (BMP + Alb, Alk Phos, ALT, AST, Total. Bili, TP)      Patient has been advised of split billing requirements and indicates understanding: Yes    COUNSELING:  Reviewed preventive health counseling, as reflected in patient instructions  Special attention given to:       Regular exercise       Healthy diet/nutrition       Osteoporosis prevention/bone health       Colon cancer screening    Estimated body mass index is 27.92 kg/m  as calculated from the following:    Height as of this encounter: 1.448 m (4' 9\").    Weight as of this encounter: 58.5 kg (129 lb).        She reports that she has never smoked. She has never used smokeless tobacco.      Appropriate preventive services were discussed with this patient, including applicable screening as appropriate for cardiovascular disease, diabetes, osteopenia/osteoporosis, and glaucoma.  As appropriate for age/gender, discussed screening for colorectal cancer, prostate cancer, breast cancer, and cervical cancer. Checklist reviewing preventive services available has been given to the patient.    Reviewed patients plan of care and provided an AVS. The Basic Care Plan (routine " screening as documented in Health Maintenance) for June meets the Care Plan requirement. This Care Plan has been established and reviewed with the Patient.    Counseling Resources:  ATP IV Guidelines  Pooled Cohorts Equation Calculator  Breast Cancer Risk Calculator  Breast Cancer: Medication to Reduce Risk  FRAX Risk Assessment  ICSI Preventive Guidelines  Dietary Guidelines for Americans, 2010  [employer]andel[employer]rot Project's MyPlate  ASA Prophylaxis  Lung CA Screening    Jennifer Hankins MD  [facility]    Identified Health Risks:    Information on urinary incontinence and treatment options given to patient.

## 2022-04-13 NOTE — LETTER
April 15, 2022      June Quintero  [address]  [place] [address]        Dear ,    We are writing to inform you of your test results.    Your labs look pretty good.  Keep trying to cut back on alcohol, increase water and exercise, and vegetable-rich diet.  Your blood counts look ok, kidney function is normal.  The blood sugar is a touch elevated, but the A1c is normal.  I would suggest possibly a visit to recheck these labs in 6 months instead of 12.  (With me.)    Resulted Orders   CBC with platelets   Result Value Ref Range    WBC Count 6.5 4.0 - 11.0 10e3/uL    RBC Count 3.61 (L) 3.80 - 5.20 10e6/uL    Hemoglobin 12.6 11.7 - 15.7 g/dL    Hematocrit 38.6 35.0 - 47.0 %     (H) 78 - 100 fL    MCH 34.9 (H) 26.5 - 33.0 pg    MCHC 32.6 31.5 - 36.5 g/dL    RDW 12.2 10.0 - 15.0 %    Platelet Count 221 150 - 450 10e3/uL   Comprehensive metabolic panel (BMP + Alb, Alk Phos, ALT, AST, Total. Bili, TP)   Result Value Ref Range    Sodium 139 136 - 145 mmol/L    Potassium 4.6 3.5 - 5.0 mmol/L    Chloride 102 98 - 107 mmol/L    Carbon Dioxide (CO2) 24 22 - 31 mmol/L    Anion Gap 13 5 - 18 mmol/L    Urea Nitrogen 14 8 - 28 mg/dL    Creatinine 0.93 0.60 - 1.10 mg/dL    Calcium 10.6 (H) 8.5 - 10.5 mg/dL    Glucose 122 70 - 125 mg/dL    Alkaline Phosphatase 105 45 - 120 U/L    AST 34 0 - 40 U/L    ALT 30 0 - 45 U/L    Protein Total 8.1 (H) 6.0 - 8.0 g/dL    Albumin 4.3 3.5 - 5.0 g/dL    Bilirubin Total 0.7 0.0 - 1.0 mg/dL    GFR Estimate 65 >60 mL/min/1.73m2      Comment:      Effective December 21, 2021 eGFRcr in adults is calculated using the 2021 CKD-EPI creatinine equation which includes age and gender (Zak et al., NEJM, DOI: 10.1056/BGDQyr3422780)   Lipid panel reflex to direct LDL Fasting   Result Value Ref Range    Cholesterol 207 (H) <=199 mg/dL    Triglycerides 222 (H) <=149 mg/dL    Direct Measure HDL 72 >=50 mg/dL      Comment:      HDL Cholesterol Reference Range:     0-2 years:    No reference ranges established for patients under 2 years old  at [facility] for lipid analytes.    2-8 years:  Greater than 45 mg/dL     18 years and older:   Female: Greater than or equal to 50 mg/dL   Male:   Greater than or equal to 40 mg/dL    LDL Cholesterol Calculated 91 <=129 mg/dL    Patient Fasting > 8hrs? Yes    UA Macro with Reflex to Micro and Culture - lab collect   Result Value Ref Range    Color Urine Yellow Colorless, Straw, Light Yellow, Yellow    Appearance Urine Clear Clear    Glucose Urine Negative Negative mg/dL    Bilirubin Urine Negative Negative    Ketones Urine Negative Negative mg/dL    Specific Gravity Urine 1.015 1.005 - 1.030    Blood Urine Negative Negative    pH Urine 5.0 5.0 - 8.0    Protein Albumin Urine Negative Negative mg/dL    Urobilinogen Urine 0.2 0.2, 1.0 E.U./dL    Nitrite Urine Negative Negative    Leukocyte Esterase Urine Negative Negative    Narrative    Microscopic not indicated   Hemoglobin A1c   Result Value Ref Range    Hemoglobin A1C 5.4 0.0 - 5.6 %      Comment:      Normal <5.7%   Prediabetes 5.7-6.4%    Diabetes 6.5% or higher     Note: Adopted from ADA consensus guidelines.       If you have any questions or concerns, please call the clinic at the number listed above.       Sincerely,      Jennifer Hankins MD

## 2022-04-14 LAB
ALBUMIN SERPL-MCNC: 4.3 G/DL (ref 3.5–5)
ALP SERPL-CCNC: 105 U/L (ref 45–120)
ALT SERPL W P-5'-P-CCNC: 30 U/L (ref 0–45)
ANION GAP SERPL CALCULATED.3IONS-SCNC: 13 MMOL/L (ref 5–18)
AST SERPL W P-5'-P-CCNC: 34 U/L (ref 0–40)
BILIRUB SERPL-MCNC: 0.7 MG/DL (ref 0–1)
BUN SERPL-MCNC: 14 MG/DL (ref 8–28)
CALCIUM SERPL-MCNC: 10.6 MG/DL (ref 8.5–10.5)
CHLORIDE BLD-SCNC: 102 MMOL/L (ref 98–107)
CHOLEST SERPL-MCNC: 207 MG/DL
CO2 SERPL-SCNC: 24 MMOL/L (ref 22–31)
CREAT SERPL-MCNC: 0.93 MG/DL (ref 0.6–1.1)
FASTING STATUS PATIENT QL REPORTED: YES
GFR SERPL CREATININE-BSD FRML MDRD: 65 ML/MIN/1.73M2
GLUCOSE BLD-MCNC: 122 MG/DL (ref 70–125)
HDLC SERPL-MCNC: 72 MG/DL
LDLC SERPL CALC-MCNC: 91 MG/DL
POTASSIUM BLD-SCNC: 4.6 MMOL/L (ref 3.5–5)
PROT SERPL-MCNC: 8.1 G/DL (ref 6–8)
SODIUM SERPL-SCNC: 139 MMOL/L (ref 136–145)
TRIGL SERPL-MCNC: 222 MG/DL

## 2022-04-15 LAB — HBA1C MFR BLD: 5.4 % (ref 0–5.6)

## 2022-05-16 DIAGNOSIS — I10 ESSENTIAL HYPERTENSION, BENIGN: ICD-10-CM

## 2022-05-16 RX ORDER — METOPROLOL SUCCINATE 50 MG/1
TABLET, EXTENDED RELEASE ORAL
Qty: 180 TABLET | Refills: 3 | Status: SHIPPED | OUTPATIENT
Start: 2022-05-16 | End: 2023-02-17

## 2022-05-17 NOTE — TELEPHONE ENCOUNTER
"Last Written Prescription Date:  4/13/22  Last Fill Quantity: 180,  # refills: 0   Last office visit provider:  4/13/22     Requested Prescriptions   Pending Prescriptions Disp Refills     metoprolol succinate ER (TOPROL XL) 50 MG 24 hr tablet [Pharmacy Med Name: METOPROLOL SUCCINATE ER TABS 50MG] 180 tablet 3     Sig: TAKE 1 TABLET TWICE A DAY       Beta-Blockers Protocol Passed - 5/16/2022 12:29 AM        Passed - Blood pressure under 140/90 in past 12 months     BP Readings from Last 3 Encounters:   04/13/22 128/86   03/31/21 136/82   01/21/20 130/78                 Passed - Patient is age 6 or older        Passed - Recent (12 mo) or future (30 days) visit within the authorizing provider's specialty     Patient has had an office visit with the authorizing provider or a provider within the authorizing providers department within the previous 12 mos or has a future within next 30 days. See \"Patient Info\" tab in inbasket, or \"Choose Columns\" in Meds & Orders section of the refill encounter.              Passed - Medication is active on med list             Ashli Clarke RN 05/16/22 7:40 PM  "

## 2022-05-27 ENCOUNTER — ANCILLARY PROCEDURE (OUTPATIENT)
Dept: MAMMOGRAPHY | Facility: CLINIC | Age: 72
End: 2022-05-27
Attending: FAMILY MEDICINE
Payer: COMMERCIAL

## 2022-05-27 DIAGNOSIS — Z12.31 VISIT FOR SCREENING MAMMOGRAM: ICD-10-CM

## 2022-05-27 PROCEDURE — 77067 SCR MAMMO BI INCL CAD: CPT

## 2022-06-16 DIAGNOSIS — I10 ESSENTIAL HYPERTENSION WITH GOAL BLOOD PRESSURE LESS THAN 140/90: ICD-10-CM

## 2022-06-16 DIAGNOSIS — E78.00 HYPERCHOLESTEREMIA: ICD-10-CM

## 2022-06-16 RX ORDER — SIMVASTATIN 10 MG
TABLET ORAL
Qty: 90 TABLET | Refills: 2 | Status: SHIPPED | OUTPATIENT
Start: 2022-06-16 | End: 2023-03-13

## 2022-06-16 RX ORDER — LOSARTAN POTASSIUM 25 MG/1
TABLET ORAL
Qty: 90 TABLET | Refills: 2 | Status: SHIPPED | OUTPATIENT
Start: 2022-06-16 | End: 2023-03-13

## 2022-06-17 NOTE — TELEPHONE ENCOUNTER
"Last Written Prescription Date:  3/18/22  Last Fill Quantity: 90,  # refills: 0   Last office visit provider:  4/13/22     Requested Prescriptions   Pending Prescriptions Disp Refills     simvastatin (ZOCOR) 10 MG tablet [Pharmacy Med Name: SIMVASTATIN TABS 10MG] 90 tablet 3     Sig: TAKE 1 TABLET AT BEDTIME       Statins Protocol Passed - 6/16/2022 12:10 AM        Passed - LDL on file in past 12 months     Recent Labs   Lab Test 04/13/22  1221   LDL 91             Passed - No abnormal creatine kinase in past 12 months     No lab results found.             Passed - Recent (12 mo) or future (30 days) visit within the authorizing provider's specialty     Patient has had an office visit with the authorizing provider or a provider within the authorizing providers department within the previous 12 mos or has a future within next 30 days. See \"Patient Info\" tab in inbasket, or \"Choose Columns\" in Meds & Orders section of the refill encounter.              Passed - Medication is active on med list        Passed - Patient is age 18 or older        Passed - No active pregnancy on record        Passed - No positive pregnancy test in past 12 months           losartan (COZAAR) 25 MG tablet [Pharmacy Med Name: LOSARTAN TABS 25MG] 90 tablet 3     Sig: TAKE 1 TABLET DAILY       Angiotensin-II Receptors Passed - 6/16/2022 12:10 AM        Passed - Last blood pressure under 140/90 in past 12 months     BP Readings from Last 3 Encounters:   04/13/22 128/86   03/31/21 136/82   01/21/20 130/78                 Passed - Recent (12 mo) or future (30 days) visit within the authorizing provider's specialty     Patient has had an office visit with the authorizing provider or a provider within the authorizing providers department within the previous 12 mos or has a future within next 30 days. See \"Patient Info\" tab in inbasket, or \"Choose Columns\" in Meds & Orders section of the refill encounter.              Passed - Medication is active on " med list        Passed - Patient is age 18 or older        Passed - No active pregnancy on record        Passed - Normal serum creatinine on file in past 12 months     Recent Labs   Lab Test 04/13/22  1221   CR 0.93       Ok to refill medication if creatinine is low          Passed - Normal serum potassium on file in past 12 months     Recent Labs   Lab Test 04/13/22  1221   POTASSIUM 4.6                    Passed - No positive pregnancy test in past 12 months             Ashli Clarke, RN 06/16/22 9:19 PM

## 2022-09-14 ENCOUNTER — TRANSFERRED RECORDS (OUTPATIENT)
Dept: HEALTH INFORMATION MANAGEMENT | Facility: CLINIC | Age: 72
End: 2022-09-14

## 2023-02-17 ENCOUNTER — LAB (OUTPATIENT)
Dept: FAMILY MEDICINE | Facility: CLINIC | Age: 73
End: 2023-02-17
Payer: COMMERCIAL

## 2023-02-17 DIAGNOSIS — Z12.11 SCREEN FOR COLON CANCER: ICD-10-CM

## 2023-03-01 ENCOUNTER — TELEPHONE (OUTPATIENT)
Dept: FAMILY MEDICINE | Facility: CLINIC | Age: 73
End: 2023-03-01
Payer: COMMERCIAL

## 2023-03-01 NOTE — TELEPHONE ENCOUNTER
Reason for Call:  Requesting a letter to have a mailbox placed at her front door due to a fractured leg    Detailed comments: please call to discuss    Phone Number Patient can be reached at: Home number on file [phone] (home)    Best Time: any    Can we leave a detailed message on this number? YES    Call taken on 3/1/2023 at 3:17 PM by Alma Smiley

## 2023-03-02 NOTE — TELEPHONE ENCOUNTER
"Called patient she fell in February on ice walking to mailbox to get her newspaper. Went to [facility]. I did pull all of this into her chart through [facility] for you to review.      From [employer]:    \"\"Hardship\" or \"Medical Problems\" is defined as an illness or handicap which would present a physical challenge for an individual to retrieve mail.    To request door delivery, you need to write a letter requesting this change and attach a statement from a Doctor. The doctor's statement should indicate you are unable to collect your mail from a curb or centralized mailbox. Both your letter and the doctor's statement must be sent to the Post Office  that delivers your mail for approval or denial. Final determination on whether or not door delivery will be granted will be made by the Post Office.     Request letters should be addressed to:      [employer]  <YOUR CITY, STATE AND ZIP CODE>    Note:  Any approval will be temporary (further information can be obtained from the local Post Office).\"    Davie Aquino RN     [facility]            "

## 2023-03-02 NOTE — TELEPHONE ENCOUNTER
Please clarify this request with the patient.  Did she fracture her leg recently?  I tried to look in care everywhere and I did not see anything.

## 2023-03-02 NOTE — TELEPHONE ENCOUNTER
Called and had a nice conversation with June, she was understanding that we cannot provide such a statement at this time.    Davie Aquino RN     [facility]

## 2023-03-02 NOTE — TELEPHONE ENCOUNTER
I read through notes from orthopedics.  It appears that patient is weightbearing as tolerated and should be weaning out of the cam boot.  The intention from the [employer] is that the service is provided for patients with a permanent disability.  It appears that patient is otherwise quite active and should fully recover from this particular injury.

## 2023-03-11 LAB — NONINV COLON CA DNA+OCC BLD SCRN STL QL: NEGATIVE

## 2023-03-12 DIAGNOSIS — E78.00 HYPERCHOLESTEREMIA: ICD-10-CM

## 2023-03-12 DIAGNOSIS — I10 ESSENTIAL HYPERTENSION WITH GOAL BLOOD PRESSURE LESS THAN 140/90: ICD-10-CM

## 2023-03-13 RX ORDER — SIMVASTATIN 10 MG
TABLET ORAL
Qty: 90 TABLET | Refills: 0 | Status: SHIPPED | OUTPATIENT
Start: 2023-03-13 | End: 2023-05-18

## 2023-03-13 RX ORDER — LOSARTAN POTASSIUM 25 MG/1
TABLET ORAL
Qty: 90 TABLET | Refills: 0 | Status: SHIPPED | OUTPATIENT
Start: 2023-03-13 | End: 2023-05-18

## 2023-03-13 NOTE — TELEPHONE ENCOUNTER
"Routing refill request to provider for review/approval because:  Drug interaction warning: override reason needed    Last Written Prescription Date:  6/16/2022  Last Fill Quantity: 90,  # refills: 2   Last office visit provider:  4/13/2022     Requested Prescriptions   Pending Prescriptions Disp Refills     simvastatin (ZOCOR) 10 MG tablet [Pharmacy Med Name: SIMVASTATIN TABS 10MG] 90 tablet 0     Sig: TAKE 1 TABLET AT BEDTIME       Statins Protocol Passed - 3/13/2023 12:00 PM        Passed - LDL on file in past 12 months     Recent Labs   Lab Test 04/13/22  1221   LDL 91             Passed - No abnormal creatine kinase in past 12 months     No lab results found.             Passed - Recent (12 mo) or future (30 days) visit within the authorizing provider's specialty     Patient has had an office visit with the authorizing provider or a provider within the authorizing providers department within the previous 12 mos or has a future within next 30 days. See \"Patient Info\" tab in [facility], or \"Choose Columns\" in Meds & Orders section of the refill encounter.              Passed - Medication is active on med list        Passed - Patient is age 18 or older        Passed - No active pregnancy on record        Passed - No positive pregnancy test in past 12 months         Signed Prescriptions Disp Refills    losartan (COZAAR) 25 MG tablet 90 tablet 0     Sig: TAKE 1 TABLET DAILY       Angiotensin-II Receptors Passed - 3/13/2023 11:59 AM        Passed - Last blood pressure under 140/90 in past 12 months     BP Readings from Last 3 Encounters:   04/13/22 128/86   03/31/21 136/82   01/21/20 130/78                 Passed - Recent (12 mo) or future (30 days) visit within the authorizing provider's specialty     Patient has had an office visit with the authorizing provider or a provider within the authorizing providers department within the previous 12 mos or has a future within next 30 days. See \"Patient Info\" tab in inbasket, " "or \"Choose Columns\" in Meds & Orders section of the refill encounter.              Passed - Medication is active on med list        Passed - Patient is age 18 or older        Passed - No active pregnancy on record        Passed - Normal serum creatinine on file in past 12 months     Recent Labs   Lab Test 04/13/22  1221   CR 0.93       Ok to refill medication if creatinine is low          Passed - Normal serum potassium on file in past 12 months     Recent Labs   Lab Test 04/13/22  1221   POTASSIUM 4.6                    Passed - No positive pregnancy test in past 12 months             Lucy Villanueva RN 03/13/23 12:03 PM    Last Written Prescription Date:  6/16/2022  Last Fill Quantity: 90,  # refills: 2   Last office visit provider:  4/13/2022     Requested Prescriptions   Pending Prescriptions Disp Refills     losartan (COZAAR) 25 MG tablet [Pharmacy Med Name: LOSARTAN TABS 25MG] 90 tablet 3     Sig: TAKE 1 TABLET DAILY       Angiotensin-II Receptors Passed - 3/13/2023 11:59 AM        Passed - Last blood pressure under 140/90 in past 12 months     BP Readings from Last 3 Encounters:   04/13/22 128/86   03/31/21 136/82   01/21/20 130/78                 Passed - Recent (12 mo) or future (30 days) visit within the authorizing provider's specialty     Patient has had an office visit with the authorizing provider or a provider within the authorizing providers department within the previous 12 mos or has a future within next 30 days. See \"Patient Info\" tab in inbasket, or \"Choose Columns\" in Meds & Orders section of the refill encounter.              Passed - Medication is active on med list        Passed - Patient is age 18 or older        Passed - No active pregnancy on record        Passed - Normal serum creatinine on file in past 12 months     Recent Labs   Lab Test 04/13/22  1221   CR 0.93       Ok to refill medication if creatinine is low          Passed - Normal serum potassium on file in past 12 months     " Recent Labs   Lab Test 04/13/22  1221   POTASSIUM 4.6                    Passed - No positive pregnancy test in past 12 months        Lucy Villanueva RN 03/13/23 12:00 PM

## 2023-04-18 RX ORDER — LOSARTAN POTASSIUM 25 MG/1
1 TABLET ORAL DAILY
COMMUNITY
End: 2023-04-19

## 2023-04-18 RX ORDER — ACETAMINOPHEN 500 MG
500-1000 TABLET ORAL
COMMUNITY

## 2023-04-18 RX ORDER — DIPHENHYDRAMINE HCL 25 MG
1 CAPSULE ORAL EVERY 6 HOURS PRN
COMMUNITY
End: 2023-04-19

## 2023-04-18 RX ORDER — SIMVASTATIN 20 MG
10 TABLET ORAL
COMMUNITY
End: 2023-04-19

## 2023-04-18 RX ORDER — BENZONATATE 100 MG/1
CAPSULE ORAL
COMMUNITY
Start: 2023-01-08 | End: 2023-07-21

## 2023-04-18 RX ORDER — TIMOLOL 5.12 MG/ML
1 SOLUTION/ DROPS OPHTHALMIC
COMMUNITY
End: 2023-04-19

## 2023-04-18 RX ORDER — OXYCODONE HYDROCHLORIDE 5 MG/1
1 TABLET ORAL EVERY 6 HOURS PRN
COMMUNITY
Start: 2023-02-09 | End: 2023-04-19

## 2023-04-19 ENCOUNTER — TRANSFERRED RECORDS (OUTPATIENT)
Dept: HEALTH INFORMATION MANAGEMENT | Facility: CLINIC | Age: 73
End: 2023-04-19

## 2023-04-19 ENCOUNTER — OFFICE VISIT (OUTPATIENT)
Dept: FAMILY MEDICINE | Facility: CLINIC | Age: 73
End: 2023-04-19
Payer: COMMERCIAL

## 2023-04-19 ENCOUNTER — MEDICAL CORRESPONDENCE (OUTPATIENT)
Dept: HEALTH INFORMATION MANAGEMENT | Facility: CLINIC | Age: 73
End: 2023-04-19

## 2023-04-19 VITALS
DIASTOLIC BLOOD PRESSURE: 82 MMHG | HEART RATE: 65 BPM | BODY MASS INDEX: 27.18 KG/M2 | RESPIRATION RATE: 24 BRPM | HEIGHT: 57 IN | TEMPERATURE: 97.6 F | WEIGHT: 126 LBS | OXYGEN SATURATION: 99 % | SYSTOLIC BLOOD PRESSURE: 160 MMHG

## 2023-04-19 DIAGNOSIS — D53.9 MACROCYTIC ANEMIA: ICD-10-CM

## 2023-04-19 DIAGNOSIS — Z00.00 ENCOUNTER FOR MEDICARE ANNUAL WELLNESS EXAM: Primary | ICD-10-CM

## 2023-04-19 DIAGNOSIS — M85.80 OSTEOPENIA, UNSPECIFIED LOCATION: ICD-10-CM

## 2023-04-19 DIAGNOSIS — Z78.0 POSTMENOPAUSAL STATUS: ICD-10-CM

## 2023-04-19 DIAGNOSIS — R73.01 IMPAIRED FASTING GLUCOSE: ICD-10-CM

## 2023-04-19 DIAGNOSIS — Z11.59 NEED FOR HEPATITIS C SCREENING TEST: ICD-10-CM

## 2023-04-19 DIAGNOSIS — E78.2 MIXED HYPERLIPIDEMIA: ICD-10-CM

## 2023-04-19 DIAGNOSIS — I10 ESSENTIAL HYPERTENSION, BENIGN: ICD-10-CM

## 2023-04-19 LAB
ERYTHROCYTE [DISTWIDTH] IN BLOOD BY AUTOMATED COUNT: 12.9 % (ref 10–15)
HBA1C MFR BLD: 5.2 % (ref 0–5.6)
HCT VFR BLD AUTO: 35.9 % (ref 35–47)
HGB BLD-MCNC: 11.9 G/DL (ref 11.7–15.7)
MCH RBC QN AUTO: 35.5 PG (ref 26.5–33)
MCHC RBC AUTO-ENTMCNC: 33.1 G/DL (ref 31.5–36.5)
MCV RBC AUTO: 107 FL (ref 78–100)
PLATELET # BLD AUTO: 207 10E3/UL (ref 150–450)
RBC # BLD AUTO: 3.35 10E6/UL (ref 3.8–5.2)
WBC # BLD AUTO: 5.4 10E3/UL (ref 4–11)

## 2023-04-19 PROCEDURE — 82607 VITAMIN B-12: CPT | Performed by: FAMILY MEDICINE

## 2023-04-19 PROCEDURE — 85027 COMPLETE CBC AUTOMATED: CPT | Performed by: FAMILY MEDICINE

## 2023-04-19 PROCEDURE — 36415 COLL VENOUS BLD VENIPUNCTURE: CPT | Performed by: FAMILY MEDICINE

## 2023-04-19 PROCEDURE — 99214 OFFICE O/P EST MOD 30 MIN: CPT | Mod: 25 | Performed by: FAMILY MEDICINE

## 2023-04-19 PROCEDURE — 83036 HEMOGLOBIN GLYCOSYLATED A1C: CPT | Performed by: FAMILY MEDICINE

## 2023-04-19 PROCEDURE — 80053 COMPREHEN METABOLIC PANEL: CPT | Performed by: FAMILY MEDICINE

## 2023-04-19 PROCEDURE — 86803 HEPATITIS C AB TEST: CPT | Performed by: FAMILY MEDICINE

## 2023-04-19 PROCEDURE — G0439 PPPS, SUBSEQ VISIT: HCPCS | Performed by: FAMILY MEDICINE

## 2023-04-19 PROCEDURE — 80061 LIPID PANEL: CPT | Performed by: FAMILY MEDICINE

## 2023-04-19 ASSESSMENT — ENCOUNTER SYMPTOMS
MYALGIAS: 1
ARTHRALGIAS: 1
BREAST MASS: 0
NERVOUS/ANXIOUS: 0
HEADACHES: 0
PALPITATIONS: 0
FEVER: 0
FREQUENCY: 0
DIARRHEA: 0
NAUSEA: 0
EYE PAIN: 0
HEARTBURN: 0
HEMATOCHEZIA: 0
PARESTHESIAS: 0
HEMATURIA: 0
DYSURIA: 0
DIZZINESS: 0
SHORTNESS OF BREATH: 0
CHILLS: 0
SORE THROAT: 0
JOINT SWELLING: 0
COUGH: 0
WEAKNESS: 0
CONSTIPATION: 0
ABDOMINAL PAIN: 0

## 2023-04-19 ASSESSMENT — ACTIVITIES OF DAILY LIVING (ADL): CURRENT_FUNCTION: NO ASSISTANCE NEEDED

## 2023-04-19 ASSESSMENT — PAIN SCALES - GENERAL: PAINLEVEL: MILD PAIN (2)

## 2023-04-19 NOTE — PROGRESS NOTES
"SUBJECTIVE:   June is a 73 year old who presents for Preventive Visit.        4/19/2023    10:53 AM   Additional Questions   Roomed by Sarah     Patient has been advised of split billing requirements and indicates understanding: Yes     Are you in the first 12 months of your Medicare coverage?  No    Healthy Habits:     In general, how would you rate your overall health?  Excellent    Frequency of exercise:  6-7 days/week    Duration of exercise:  45-60 minutes    Do you usually eat at least 4 servings of fruit and vegetables a day, include whole grains    & fiber and avoid regularly eating high fat or \"junk\" foods?  Yes    Taking medications regularly:  Yes    Medication side effects:  None and Other    Ability to successfully perform activities of daily living:  No assistance needed    Home Safety:  No safety concerns identified    Hearing Impairment:  No hearing concerns    In the past 6 months, have you been bothered by leaking of urine?  No    In general, how would you rate your overall mental or emotional health?  Good      PHQ-2 Total Score: 0    Additional concerns today:  Yes    Problems to Add:  1. Has a blood pressure cuff at home, but a wrist cuff.  Was checking daily blood pressures, then cut back when they were good.  Yesterday was 116/64.  2. Fell on ice in February and fractured her proximal fibula.  Was in a CAM boot, then transitioned out.  Followed with [facility], was seen at [facility].    Have you ever done Advance Care Planning? (For example, a Health Directive, POLST, or a discussion with a medical provider or your loved ones about your wishes): No, advance care planning information given to patient to review.  Advanced care planning was discussed at today's visit.       Fall risk  Fallen 2 or more times in the past year?: Yes  Any fall with injury in the past year?: Yes    Cognitive Screening   1) Repeat 3 items (Leader, Season, Table)    2) Clock draw: NORMAL  3) 3 item recall: " Recalls 3 objects  Results: 3 items recalled: COGNITIVE IMPAIRMENT LESS LIKELY    Mini-CogTM Copyright VIDYA Farrar. Licensed by the author for use in [facility]; reprinted with permission (irma@[facility]). All rights reserved.      Do you have sleep apnea, excessive snoring or daytime drowsiness?: no    Reviewed and updated as needed this visit by clinical staff   Tobacco  Allergies  Meds              Reviewed and updated as needed this visit by Provider   Tobacco  Allergies  Meds  Problems  Med Hx  Surg Hx  Fam Hx  Soc   Hx          Social History     Tobacco Use     Smoking status: Never     Smokeless tobacco: Never   Vaping Use     Vaping status: Never Used   Substance Use Topics     Alcohol use: Yes     Alcohol/week: 14.0 - 21.0 standard drinks of alcohol     Comment: 14-21/wk             4/19/2023    10:44 AM   Alcohol Use   Prescreen: >3 drinks/day or >7 drinks/week? No     Do you have a current opioid prescription? No  Do you use any other controlled substances or medications that are not prescribed by a provider? None        Current providers sharing in care for this patient include:   Patient Care Team:  Jennifer Hankins MD as PCP - General  Jennifer Hankins MD as Assigned PCP    The following health maintenance items are reviewed in Epic and correct as of today:  Health Maintenance   Topic Date Due     ANNUAL REVIEW OF HM ORDERS  Never done     HEPATITIS C SCREENING  Never done     MEDICARE ANNUAL WELLNESS VISIT  04/13/2023     FALL RISK ASSESSMENT  04/19/2024     MAMMO SCREENING  05/27/2024     COLORECTAL CANCER SCREENING  03/04/2026     LIPID  04/13/2027     ADVANCE CARE PLANNING  04/13/2027     DTAP/TDAP/TD IMMUNIZATION (9 - Td or Tdap) 08/10/2031     DEXA  06/10/2036     PHQ-2 (once per calendar year)  Completed     INFLUENZA VACCINE  Completed     Pneumococcal Vaccine: 65+ Years  Completed     ZOSTER IMMUNIZATION  Completed     COVID-19 Vaccine  Completed     IPV  IMMUNIZATION  Aged Out     MENINGITIS IMMUNIZATION  Aged Out     BP Readings from Last 3 Encounters:   23 (!) 164/90   22 128/86   21 136/82    Wt Readings from Last 3 Encounters:   23 57.2 kg (126 lb)   22 58.5 kg (129 lb)   21 59.7 kg (131 lb 9.6 oz)                  Patient Active Problem List   Diagnosis     Allergic rhinitis     Essential hypertension, benign     Hyperlipidemia     Osteoporosis     Leg length discrepancy     Alcohol use disorder, mild, abuse     Adjustment disorder with anxious mood     No past surgical history on file.    Social History     Tobacco Use     Smoking status: Never     Smokeless tobacco: Never   Vaping Use     Vaping status: Never Used   Substance Use Topics     Alcohol use: Yes     Alcohol/week: 14.0 - 21.0 standard drinks of alcohol     Comment: 14-21/wk     Family History   Problem Relation Age of Onset     Dementia Mother          age 82     Heart Disease Father          age 60- had a hole in heart     Parkinsonism Brother      Hearing Loss Brother      Hearing Loss Brother      Breast Cancer Sister 60        fraternal twin     Hearing Loss Sister      Diabetes Sister      Hearing Loss Sister      Colon Cancer No family hx of      Colorectal Cancer No family hx of      Coronary Artery Disease No family hx of          Current Outpatient Medications   Medication Sig Dispense Refill     acetaminophen (TYLENOL) 500 MG tablet Take 500-1,000 mg by mouth       benzonatate (TESSALON) 100 MG capsule TAKE 1 CAPSULE BY MOUTH THREE TIMES A DAY AS NEEDED FOR COUGH FOR UP TO 7 DAYS       calcium carbonate 600 mg-vitamin D 400 units (CALTRATE) 600-400 MG-UNIT per tablet Take 1 tablet by mouth 2 times daily.       diphenhydrAMINE (BENADRYL) 12.5 MG/5ML elixir Take 5 ml daily for allergies       losartan (COZAAR) 25 MG tablet TAKE 1 TABLET DAILY 90 tablet 0     metoprolol succinate ER (TOPROL XL) 50 MG 24 hr tablet Take 1 tablet (50 mg) by mouth 2  "times daily 180 tablet 0     multivitamin w/minerals (THERA-VIT-M) tablet Take 1 tablet by mouth daily.       simvastatin (ZOCOR) 10 MG tablet TAKE 1 TABLET AT BEDTIME 90 tablet 0     timolol maleate (TIMOPTIC) 0.5 % ophthalmic solution 1 drop       vitamin E 400 units TABS Take 400 Units by mouth daily       White Petrolatum-Mineral Oil (WH PETROL-MINERAL OIL-LANOLIN) 0.1-0.1 % OINT at bedtime as needed.       Allergies   Allergen Reactions     Ibuprofen Swelling     Throat swelling     Aspirin      Garlic Other (See Comments)     \"Very nauseous\"     Motrin Pm      Penicillins      Sulfa Drugs      Atorvastatin Nausea         Review of Systems   Constitutional: Negative for chills and fever.   HENT: Negative for congestion, ear pain, hearing loss and sore throat.    Eyes: Negative for pain and visual disturbance.   Respiratory: Negative for cough and shortness of breath.    Cardiovascular: Negative for chest pain, palpitations and peripheral edema.   Gastrointestinal: Negative for abdominal pain, constipation, diarrhea, heartburn, hematochezia and nausea.   Breasts:  Negative for tenderness, breast mass and discharge.   Genitourinary: Negative for dysuria, frequency, genital sores, hematuria, pelvic pain, urgency, vaginal bleeding and vaginal discharge.   Musculoskeletal: Positive for arthralgias and myalgias. Negative for joint swelling.   Skin: Negative for rash.   Neurological: Negative for dizziness, weakness, headaches and paresthesias.   Psychiatric/Behavioral: Negative for mood changes. The patient is not nervous/anxious.        OBJECTIVE:   BP (!) 164/90 (BP Location: Right arm, Patient Position: Sitting, Cuff Size: Adult Regular)   Pulse 65   Temp 97.6  F (36.4  C) (Temporal)   Resp 24   Ht 1.444 m (4' 8.85\")   Wt 57.2 kg (126 lb)   SpO2 99%   BMI 27.41 kg/m   Estimated body mass index is 27.41 kg/m  as calculated from the following:    Height as of this encounter: 1.444 m (4' 8.85\").    Weight as " of this encounter: 57.2 kg (126 lb).  Physical Exam  GENERAL APPEARANCE: healthy, alert and no distress  EYES: Eyes grossly normal to inspection, PERRL and conjunctivae and sclerae normal  HENT: ear canals and TM's normal, nose and mouth without ulcers or lesions, oropharynx clear and oral mucous membranes moist  NECK: no adenopathy, no asymmetry, masses, or scars and thyroid normal to palpation  RESP: lungs clear to auscultation - no rales, rhonchi or wheezes  BREAST: declines  CV: regular rate and rhythm, normal S1 S2, no S3 or S4, no murmur, click or rub, no peripheral edema and peripheral pulses strong  ABDOMEN: soft, nontender, no hepatosplenomegaly, no masses and bowel sounds normal  MS: no musculoskeletal defects are noted and gait is age appropriate without ataxia  SKIN: no suspicious lesions or rashes  NEURO: Normal strength and tone, sensory exam grossly normal, mentation intact and speech normal  PSYCH: mentation appears normal and affect normal/bright    Diagnostic Test Results:  Labs reviewed in Epic  Results for orders placed or performed in visit on 04/19/23   Hepatitis C Screen Reflex to HCV RNA Quant and Genotype     Status: Normal   Result Value Ref Range    Hepatitis C Antibody Nonreactive Nonreactive    Narrative    Assay performance characteristics have not been established for newborns, infants, and children.   Vitamin B12     Status: Normal   Result Value Ref Range    Vitamin B12 968 232 - 1,245 pg/mL   Hemoglobin A1c     Status: Normal   Result Value Ref Range    Hemoglobin A1C 5.2 0.0 - 5.6 %   Comprehensive metabolic panel (BMP + Alb, Alk Phos, ALT, AST, Total. Bili, TP)     Status: Abnormal   Result Value Ref Range    Sodium 136 136 - 145 mmol/L    Potassium 4.9 3.4 - 5.3 mmol/L    Chloride 102 98 - 107 mmol/L    Carbon Dioxide (CO2) 23 22 - 29 mmol/L    Anion Gap 11 7 - 15 mmol/L    Urea Nitrogen 13.2 8.0 - 23.0 mg/dL    Creatinine 0.87 0.51 - 0.95 mg/dL    Calcium 9.7 8.8 - 10.2 mg/dL     Glucose 121 (H) 70 - 99 mg/dL    Alkaline Phosphatase 109 (H) 35 - 104 U/L    AST 38 (H) 10 - 35 U/L    ALT 32 10 - 35 U/L    Protein Total 8.0 6.4 - 8.3 g/dL    Albumin 4.7 3.5 - 5.2 g/dL    Bilirubin Total 0.4 <=1.2 mg/dL    GFR Estimate 70 >60 mL/min/1.73m2   CBC with platelets     Status: Abnormal   Result Value Ref Range    WBC Count 5.4 4.0 - 11.0 10e3/uL    RBC Count 3.35 (L) 3.80 - 5.20 10e6/uL    Hemoglobin 11.9 11.7 - 15.7 g/dL    Hematocrit 35.9 35.0 - 47.0 %     (H) 78 - 100 fL    MCH 35.5 (H) 26.5 - 33.0 pg    MCHC 33.1 31.5 - 36.5 g/dL    RDW 12.9 10.0 - 15.0 %    Platelet Count 207 150 - 450 10e3/uL   Lipid panel reflex to direct LDL Non-fasting     Status: Abnormal   Result Value Ref Range    Cholesterol 202 (H) <200 mg/dL    Triglycerides 231 (H) <150 mg/dL    Direct Measure HDL 84 >=50 mg/dL    LDL Cholesterol Calculated 72 <=100 mg/dL    Non HDL Cholesterol 118 <130 mg/dL    Narrative    Cholesterol  Desirable:  <200 mg/dL    Triglycerides  Normal:  Less than 150 mg/dL  Borderline High:  150-199 mg/dL  High:  200-499 mg/dL  Very High:  Greater than or equal to 500 mg/dL    Direct Measure HDL  Female:  Greater than or equal to 50 mg/dL   Male:  Greater than or equal to 40 mg/dL    LDL Cholesterol  Desirable:  <100mg/dL  Above Desirable:  100-129 mg/dL   Borderline High:  130-159 mg/dL   High:  160-189 mg/dL   Very High:  >= 190 mg/dL    Non HDL Cholesterol  Desirable:  130 mg/dL  Above Desirable:  130-159 mg/dL  Borderline High:  160-189 mg/dL  High:  190-219 mg/dL  Very High:  Greater than or equal to 220 mg/dL       ASSESSMENT / PLAN:   1. Encounter for Medicare annual wellness exam  Routine Medicare wellness visit, updated in EMR.  Non-fasting labs updated as below.  Immunizations are up to date.  Mammogram will be due next month.  Recommend follow-up bone density in 2 years, will be due 6/2023.  Colonoscopy recently updated, will be due 3/2026.  Plan repeat physical in 1 year.  - PRIMARY  CARE FOLLOW-UP SCHEDULING; Future    2. Essential hypertension, benign  Patient reports white coat hypertension with normal readings at home.  Asked her to send me some home readings over the next couple of weeks.  Labs updated as below.  - Comprehensive metabolic panel (BMP + Alb, Alk Phos, ALT, AST, Total. Bili, TP)  - CBC with platelets    3. Mixed hyperlipidemia  Continues on simvastatin.  Lipids are quite reasonable, but would consider increasing at a future visit.  She did not tolerate Lipitor.  - Comprehensive metabolic panel (BMP + Alb, Alk Phos, ALT, AST, Total. Bili, TP)  - Lipid panel reflex to direct LDL Non-fasting    4. Osteopenia, unspecified location  5. Postmenopausal status  Patient is due for repeat bone density test this June.  Information given to schedule.  - DX Hip/Pelvis/Spine; Future  - Comprehensive metabolic panel (BMP + Alb, Alk Phos, ALT, AST, Total. Bili, TP)    6. Macrocytic anemia  B12 level is normal.  Advised stopping alcohol use entirely.  - Vitamin B12    7. Need for hepatitis C screening test  Hep C screening updated today.  - Hepatitis C Screen Reflex to HCV RNA Quant and Genotype    8. Impaired fasting glucose  A1c remains within normal range.  - Hemoglobin A1c      Patient has been advised of split billing requirements and indicates understanding: Yes      COUNSELING:  Reviewed preventive health counseling, as reflected in patient instructions  Special attention given to:       Regular exercise       Healthy diet/nutrition       Alcohol Use        Osteoporosis prevention/bone health       Colon cancer screening       Hepatitis C screening       The 10-year ASCVD risk score (Alexis BARTLETT, et al., 2019) is: 22.3%    Values used to calculate the score:      Age: 73 years      Sex: Female      Is Non- : No      Diabetic: No      Tobacco smoker: No      Systolic Blood Pressure: 160 mmHg      Is BP treated: Yes      HDL Cholesterol: 84 mg/dL      Total  "Cholesterol: 202 mg/dL      BMI:   Estimated body mass index is 27.41 kg/m  as calculated from the following:    Height as of this encounter: 1.444 m (4' 8.85\").    Weight as of this encounter: 57.2 kg (126 lb).         She reports that she has never smoked. She has never used smokeless tobacco.      Appropriate preventive services were discussed with this patient, including applicable screening as appropriate for cardiovascular disease, diabetes, osteopenia/osteoporosis, and glaucoma.  As appropriate for age/gender, discussed screening for colorectal cancer, prostate cancer, breast cancer, and cervical cancer. Checklist reviewing preventive services available has been given to the patient.    Reviewed patients plan of care and provided an AVS. The Basic Care Plan (routine screening as documented in Health Maintenance) for June meets the Care Plan requirement. This Care Plan has been established and reviewed with the Patient.          Jennifer Hankins MD  [facility]    Identified Health Risks:    I have reviewed Opioid Use Disorder and Substance Use Disorder risk factors and made any needed referrals.       She is at risk for falling and has been provided with information to reduce the risk of falling at home.  "

## 2023-04-19 NOTE — PATIENT INSTRUCTIONS
You can call [phone] to schedule your bone density test at your convenience.  I would put this out closer to June.    Please drop off some blood pressure readings.  Check daily to every other day and compile a couple of weeks worth of readings.      Patient Education   Personalized Prevention Plan  You are due for the preventive services outlined below.  Your care team is available to assist you in scheduling these services.  If you have already completed any of these items, please share that information with your care team to update in your medical record.  Health Maintenance Due   Topic Date Due    ANNUAL REVIEW OF  ORDERS  Never done    Hepatitis C Screening  Never done       Preventing Falls at Home  A person can fall for many reasons. Older adults may fall because reaction time slows down as we age. Your muscles and joints may get stiff, weak, or less flexible because of illness, medicines, or a physical condition.   Other health problems that make falls more likely include:   Arthritis  Dizziness or lightheadedness when you stand up (orthostatic hypotension)  History of a stroke  Dizziness  Anemia  Certain medicines taken for mental illness or to control blood pressure.  Problems with balance or gait  Bladder or urinary problems  History of falling  Changes in vision (vision impairment)  Changes in thinking skills and memory (cognitive impairment)  Injuries from a fall can include serious injuries such as broken bones, dislocated joints, internal bleeding and cuts. Injuries like these can limit your independence.   Prevention tips  To help prevent falls and fall-related injuries, follow the tips below.    Floors  To make floors safer:   Put nonskid pads under area rugs.  Remove small rugs.  Replace worn floor coverings.  Tack carpets firmly to each step on carpeted stairs. Put nonskid strips on the edges of uncarpeted stairs.  Keep floors and stairs free of clutter and cords.  Arrange furniture so there  are clear pathways.  Clean up any spills right away.  Bathrooms    To make bathrooms safer:   Install grab bars in the tub or shower.  Apply nonskid strips or put a nonskid rubber mat in the tub or shower.  Sit on a bath chair to bathe.  Use bathmats with nonskid backing.  Lighting  To improve visibility in your home:    Keep a flashlight in each room. Or put a lamp next to the bed within easy reach.  Put nightlights in the bedrooms, hallways, kitchen, and bathrooms.  Make sure all stairways have good lighting.  Take your time when going up and down stairs.  Put handrails on both sides of stairs and in walkways for more support. To prevent injury to your wrist or arm, don t use handrails to pull yourself up.  Install grab bars to pull yourself up.  Move or rearrange items that you use often. This will make them easier to find or reach.  Look at your home to find any safety hazards. Especially look at doorways, walkways, and the driveway. Remove or repair any safety problems that you find.  Other changes to make  Look around to find any safety hazards. Look closely at doorways, walkways, and the driveway. Remove or repair any safety problems that you find.  Wear shoes that fit well.  Take your time when going up and down stairs.  Put handrails on both sides of stairs and in walkways for more support. To prevent injury to your wrist or arm, don t use handrails to pull yourself up.  Install grab bars wherever needed to pull yourself up.  Arrange items that you use often. This will make them easier to find or reach.    [employer] last reviewed this educational content on 3/1/2020    1515-8227 [employer]. All rights reserved. This information is not intended as a substitute for professional medical care. Always follow your healthcare professional's instructions.

## 2023-04-19 NOTE — LETTER
April 20, 2023      June Quintero  [address]  [place] [address]        Dear ,    We are writing to inform you of your test results.    Your labs look good with the exception of an elevated liver enzyme.  It is critical that you stop drinking entirely, as this will cause liver damage that may not be reversible.  Your kidney function and blood counts look good.  At a future visit, we will talk about possibly increasing your simvastatin, but first priority is drinking plenty of fluids and no alcohol.    Resulted Orders   Hepatitis C Screen Reflex to HCV RNA Quant and Genotype   Result Value Ref Range    Hepatitis C Antibody Nonreactive Nonreactive    Narrative    Assay performance characteristics have not been established for newborns, infants, and children.   Vitamin B12   Result Value Ref Range    Vitamin B12 968 232 - 1,245 pg/mL   Hemoglobin A1c   Result Value Ref Range    Hemoglobin A1C 5.2 0.0 - 5.6 %      Comment:      Normal <5.7%   Prediabetes 5.7-6.4%    Diabetes 6.5% or higher     Note: Adopted from ADA consensus guidelines.   Comprehensive metabolic panel (BMP + Alb, Alk Phos, ALT, AST, Total. Bili, TP)   Result Value Ref Range    Sodium 136 136 - 145 mmol/L    Potassium 4.9 3.4 - 5.3 mmol/L    Chloride 102 98 - 107 mmol/L    Carbon Dioxide (CO2) 23 22 - 29 mmol/L    Anion Gap 11 7 - 15 mmol/L    Urea Nitrogen 13.2 8.0 - 23.0 mg/dL    Creatinine 0.87 0.51 - 0.95 mg/dL    Calcium 9.7 8.8 - 10.2 mg/dL    Glucose 121 (H) 70 - 99 mg/dL    Alkaline Phosphatase 109 (H) 35 - 104 U/L    AST 38 (H) 10 - 35 U/L    ALT 32 10 - 35 U/L    Protein Total 8.0 6.4 - 8.3 g/dL    Albumin 4.7 3.5 - 5.2 g/dL    Bilirubin Total 0.4 <=1.2 mg/dL    GFR Estimate 70 >60 mL/min/1.73m2      Comment:      eGFR calculated using 2021 CKD-EPI equation.   CBC with platelets   Result Value Ref Range    WBC Count 5.4 4.0 - 11.0 10e3/uL    RBC Count 3.35 (L) 3.80 - 5.20 10e6/uL    Hemoglobin 11.9 11.7 - 15.7  g/dL    Hematocrit 35.9 35.0 - 47.0 %     (H) 78 - 100 fL    MCH 35.5 (H) 26.5 - 33.0 pg    MCHC 33.1 31.5 - 36.5 g/dL    RDW 12.9 10.0 - 15.0 %    Platelet Count 207 150 - 450 10e3/uL   Lipid panel reflex to direct LDL Non-fasting   Result Value Ref Range    Cholesterol 202 (H) <200 mg/dL    Triglycerides 231 (H) <150 mg/dL    Direct Measure HDL 84 >=50 mg/dL    LDL Cholesterol Calculated 72 <=100 mg/dL    Non HDL Cholesterol 118 <130 mg/dL    Narrative    Cholesterol  Desirable:  <200 mg/dL    Triglycerides  Normal:  Less than 150 mg/dL  Borderline High:  150-199 mg/dL  High:  200-499 mg/dL  Very High:  Greater than or equal to 500 mg/dL    Direct Measure HDL  Female:  Greater than or equal to 50 mg/dL   Male:  Greater than or equal to 40 mg/dL    LDL Cholesterol  Desirable:  <100mg/dL  Above Desirable:  100-129 mg/dL   Borderline High:  130-159 mg/dL   High:  160-189 mg/dL   Very High:  >= 190 mg/dL    Non HDL Cholesterol  Desirable:  130 mg/dL  Above Desirable:  130-159 mg/dL  Borderline High:  160-189 mg/dL  High:  190-219 mg/dL  Very High:  Greater than or equal to 220 mg/dL       If you have any questions or concerns, please call the clinic at the number listed above.       Sincerely,      Jennifer Hankins MD

## 2023-04-20 LAB
ALBUMIN SERPL BCG-MCNC: 4.7 G/DL (ref 3.5–5.2)
ALP SERPL-CCNC: 109 U/L (ref 35–104)
ALT SERPL W P-5'-P-CCNC: 32 U/L (ref 10–35)
ANION GAP SERPL CALCULATED.3IONS-SCNC: 11 MMOL/L (ref 7–15)
AST SERPL W P-5'-P-CCNC: 38 U/L (ref 10–35)
BILIRUB SERPL-MCNC: 0.4 MG/DL
BUN SERPL-MCNC: 13.2 MG/DL (ref 8–23)
CALCIUM SERPL-MCNC: 9.7 MG/DL (ref 8.8–10.2)
CHLORIDE SERPL-SCNC: 102 MMOL/L (ref 98–107)
CHOLEST SERPL-MCNC: 202 MG/DL
CREAT SERPL-MCNC: 0.87 MG/DL (ref 0.51–0.95)
DEPRECATED HCO3 PLAS-SCNC: 23 MMOL/L (ref 22–29)
GFR SERPL CREATININE-BSD FRML MDRD: 70 ML/MIN/1.73M2
GLUCOSE SERPL-MCNC: 121 MG/DL (ref 70–99)
HCV AB SERPL QL IA: NONREACTIVE
HDLC SERPL-MCNC: 84 MG/DL
LDLC SERPL CALC-MCNC: 72 MG/DL
NONHDLC SERPL-MCNC: 118 MG/DL
POTASSIUM SERPL-SCNC: 4.9 MMOL/L (ref 3.4–5.3)
PROT SERPL-MCNC: 8 G/DL (ref 6.4–8.3)
SODIUM SERPL-SCNC: 136 MMOL/L (ref 136–145)
TRIGL SERPL-MCNC: 231 MG/DL
VIT B12 SERPL-MCNC: 968 PG/ML (ref 232–1245)

## 2023-04-21 DIAGNOSIS — I10 ESSENTIAL HYPERTENSION, BENIGN: ICD-10-CM

## 2023-04-21 RX ORDER — METOPROLOL SUCCINATE 50 MG/1
TABLET, EXTENDED RELEASE ORAL
Qty: 180 TABLET | Refills: 3 | Status: SHIPPED | OUTPATIENT
Start: 2023-04-21 | End: 2024-03-05

## 2023-04-21 NOTE — TELEPHONE ENCOUNTER
"Routing refill request to provider for review/approval because:  BP fails    Last Written Prescription Date:  2/17/23  Last Fill Quantity: 180,  # refills: 0   Last office visit provider:  4/19/23     Requested Prescriptions   Pending Prescriptions Disp Refills     metoprolol succinate ER (TOPROL XL) 50 MG 24 hr tablet [Pharmacy Med Name: Metoprolol Succinate ER 50 MG Oral Tablet Extended Release 24 Hour] 180 tablet 3     Sig: TAKE 1 TABLET BY MOUTH TWICE  DAILY       Beta-Blockers Protocol Failed - 4/21/2023  4:30 PM        Failed - Blood pressure under 140/90 in past 12 months     BP Readings from Last 3 Encounters:   04/19/23 (!) 160/82   04/13/22 128/86   03/31/21 136/82                 Passed - Patient is age 6 or older        Passed - Recent (12 mo) or future (30 days) visit within the authorizing provider's specialty     Patient has had an office visit with the authorizing provider or a provider within the authorizing providers department within the previous 12 mos or has a future within next 30 days. See \"Patient Info\" tab in inbasket, or \"Choose Columns\" in Meds & Orders section of the refill encounter.              Passed - Medication is active on med list             JOHN MCCANN RN 04/21/23 4:30 PM  "

## 2023-04-22 PROBLEM — Z78.0 POSTMENOPAUSAL STATUS: Status: ACTIVE | Noted: 2023-04-22

## 2023-04-22 PROBLEM — D53.9 MACROCYTIC ANEMIA: Status: ACTIVE | Noted: 2023-04-22

## 2023-04-22 PROBLEM — R73.01 IMPAIRED FASTING GLUCOSE: Status: ACTIVE | Noted: 2023-04-22

## 2023-05-08 ENCOUNTER — TELEPHONE (OUTPATIENT)
Dept: FAMILY MEDICINE | Facility: CLINIC | Age: 73
End: 2023-05-08
Payer: COMMERCIAL

## 2023-05-08 NOTE — TELEPHONE ENCOUNTER
Please call patient:    Patient sent in blood pressure readings from home.  These will be scanned into her record.  Over a total of 22 readings, only four are above goal range with systolics in the 140s.  We will recommend making no further changes to her medication regimen for now.  Follow-up as previously discussed.

## 2023-05-18 DIAGNOSIS — E78.00 HYPERCHOLESTEREMIA: ICD-10-CM

## 2023-05-18 DIAGNOSIS — I10 ESSENTIAL HYPERTENSION WITH GOAL BLOOD PRESSURE LESS THAN 140/90: ICD-10-CM

## 2023-05-18 NOTE — TELEPHONE ENCOUNTER
Pending Prescriptions:                       Disp   Refills    simvastatin (ZOCOR) 10 MG tablet          90 tab*0            Sig: Take 1 tablet (10 mg) by mouth At Bedtime    losartan (COZAAR) 25 MG tablet            90 tab*0            Sig: Take 1 tablet (25 mg) by mouth daily

## 2023-05-19 RX ORDER — LOSARTAN POTASSIUM 25 MG/1
25 TABLET ORAL DAILY
Qty: 90 TABLET | Refills: 3 | Status: SHIPPED | OUTPATIENT
Start: 2023-05-19 | End: 2024-03-05

## 2023-05-19 RX ORDER — SIMVASTATIN 10 MG
10 TABLET ORAL AT BEDTIME
Qty: 90 TABLET | Refills: 3 | Status: SHIPPED | OUTPATIENT
Start: 2023-05-19 | End: 2024-03-05

## 2023-05-19 NOTE — TELEPHONE ENCOUNTER
"Routing refill request to provider for review/approval because:  BP fails for  losartan (COZAAR) 25 MG tablet protocol     simvastatin (ZOCOR) 10 MG tablet  Last Written Prescription Date:  3/13/23  Last Fill Quantity: 90,  # refills: 0   Last office visit provider:  4/19/23     losartan (COZAAR) 25 MG tablet  Last Written Prescription Date:  3/13/23  Last Fill Quantity: 90,  # refills: 0   Last office visit provider:  4/19/23       Requested Prescriptions   Pending Prescriptions Disp Refills     simvastatin (ZOCOR) 10 MG tablet 90 tablet 0     Sig: Take 1 tablet (10 mg) by mouth At Bedtime       Statins Protocol Passed - 5/19/2023 12:25 PM        Passed - LDL on file in past 12 months     Recent Labs   Lab Test 04/19/23  1149   LDL 72             Passed - No abnormal creatine kinase in past 12 months     No lab results found.             Passed - Recent (12 mo) or future (30 days) visit within the authorizing provider's specialty     Patient has had an office visit with the authorizing provider or a provider within the authorizing providers department within the previous 12 mos or has a future within next 30 days. See \"Patient Info\" tab in inbasket, or \"Choose Columns\" in Meds & Orders section of the refill encounter.              Passed - Medication is active on med list        Passed - Patient is age 18 or older        Passed - No active pregnancy on record        Passed - No positive pregnancy test in past 12 months           losartan (COZAAR) 25 MG tablet 90 tablet 0     Sig: Take 1 tablet (25 mg) by mouth daily       Angiotensin-II Receptors Failed - 5/19/2023 12:25 PM        Failed - Last blood pressure under 140/90 in past 12 months     BP Readings from Last 3 Encounters:   04/19/23 (!) 160/82   04/13/22 128/86   03/31/21 136/82                 Passed - Recent (12 mo) or future (30 days) visit within the authorizing provider's specialty     Patient has had an office visit with the authorizing provider or a " "provider within the authorizing providers department within the previous 12 mos or has a future within next 30 days. See \"Patient Info\" tab in inbasket, or \"Choose Columns\" in Meds & Orders section of the refill encounter.              Passed - Medication is active on med list        Passed - Patient is age 18 or older        Passed - No active pregnancy on record        Passed - Normal serum creatinine on file in past 12 months     Recent Labs   Lab Test 04/19/23  1149   CR 0.87       Ok to refill medication if creatinine is low          Passed - Normal serum potassium on file in past 12 months     Recent Labs   Lab Test 04/19/23  1149   POTASSIUM 4.9                    Passed - No positive pregnancy test in past 12 months             JOHN MCCANN RN 05/19/23 12:26 PM  "

## 2023-05-26 DIAGNOSIS — E78.00 HYPERCHOLESTEREMIA: ICD-10-CM

## 2023-05-26 DIAGNOSIS — I10 ESSENTIAL HYPERTENSION WITH GOAL BLOOD PRESSURE LESS THAN 140/90: ICD-10-CM

## 2023-05-27 RX ORDER — SIMVASTATIN 10 MG
10 TABLET ORAL AT BEDTIME
Qty: 90 TABLET | Refills: 3 | OUTPATIENT
Start: 2023-05-27

## 2023-05-27 RX ORDER — LOSARTAN POTASSIUM 25 MG/1
25 TABLET ORAL DAILY
Qty: 90 TABLET | Refills: 3 | OUTPATIENT
Start: 2023-05-27

## 2023-05-30 ENCOUNTER — ANCILLARY PROCEDURE (OUTPATIENT)
Dept: MAMMOGRAPHY | Facility: CLINIC | Age: 73
End: 2023-05-30
Attending: FAMILY MEDICINE
Payer: COMMERCIAL

## 2023-05-30 ENCOUNTER — HOSPITAL ENCOUNTER (OUTPATIENT)
Dept: BONE DENSITY | Facility: HOSPITAL | Age: 73
Discharge: HOME | End: 2023-05-30
Attending: FAMILY MEDICINE
Payer: COMMERCIAL

## 2023-05-30 DIAGNOSIS — M85.80 OSTEOPENIA, UNSPECIFIED LOCATION: ICD-10-CM

## 2023-05-30 DIAGNOSIS — Z12.31 VISIT FOR SCREENING MAMMOGRAM: ICD-10-CM

## 2023-05-30 DIAGNOSIS — Z78.0 POSTMENOPAUSAL STATUS: ICD-10-CM

## 2023-05-30 PROCEDURE — 77080 DXA BONE DENSITY AXIAL: CPT

## 2023-05-30 PROCEDURE — 77067 SCR MAMMO BI INCL CAD: CPT

## 2023-07-21 ENCOUNTER — OFFICE VISIT (OUTPATIENT)
Dept: FAMILY MEDICINE | Facility: CLINIC | Age: 73
End: 2023-07-21
Payer: COMMERCIAL

## 2023-07-21 ENCOUNTER — HOSPITAL ENCOUNTER (OUTPATIENT)
Dept: GENERAL RADIOLOGY | Facility: HOSPITAL | Age: 73
Discharge: HOME | End: 2023-07-21
Attending: EMERGENCY MEDICINE | Admitting: EMERGENCY MEDICINE
Payer: COMMERCIAL

## 2023-07-21 VITALS
HEART RATE: 79 BPM | WEIGHT: 124.9 LBS | OXYGEN SATURATION: 98 % | DIASTOLIC BLOOD PRESSURE: 80 MMHG | TEMPERATURE: 98.6 F | RESPIRATION RATE: 18 BRPM | SYSTOLIC BLOOD PRESSURE: 127 MMHG | BODY MASS INDEX: 27.17 KG/M2

## 2023-07-21 DIAGNOSIS — R05.1 ACUTE COUGH: Primary | ICD-10-CM

## 2023-07-21 DIAGNOSIS — R05.1 ACUTE COUGH: ICD-10-CM

## 2023-07-21 DIAGNOSIS — J20.9 ACUTE BRONCHITIS, UNSPECIFIED ORGANISM: ICD-10-CM

## 2023-07-21 PROCEDURE — 71046 X-RAY EXAM CHEST 2 VIEWS: CPT

## 2023-07-21 PROCEDURE — 99213 OFFICE O/P EST LOW 20 MIN: CPT | Performed by: EMERGENCY MEDICINE

## 2023-07-21 RX ORDER — DOXYCYCLINE HYCLATE 100 MG
100 TABLET ORAL 2 TIMES DAILY
Qty: 14 TABLET | Refills: 0 | Status: SHIPPED | OUTPATIENT
Start: 2023-07-21 | End: 2023-07-28

## 2023-07-21 RX ORDER — BENZONATATE 100 MG/1
100 CAPSULE ORAL 3 TIMES DAILY PRN
Qty: 21 CAPSULE | Refills: 0 | Status: SHIPPED | OUTPATIENT
Start: 2023-07-21 | End: 2024-05-01

## 2023-07-21 NOTE — PROGRESS NOTES
Impression:  Acute bronchitis.  Given her rhonchi and wheezing on the left lung,    Plan:   I do not see any infiltrate on the chest x-ray, but I am going to go ahead and start her on some doxycycline for bronchitis or possible early pneumonia.        Chief Complaint:  Patient presents with:  Cough: Pt states started about 5 days cough,runny nose,headache,and sore throat          HPI:   June Quintero is a 73 year old female who presents to this clinic for the evaluation of cough.  Patient complains of 5-day history of a cough, runny nose, headache, sore throat.  No shortness of breath or chest pain.  No fever or chills.  No abdominal pain vomiting or diarrhea.      PMH:   Past Medical History:   Diagnosis Date     Acute gastric ulcer 02/19/2013    Problem list name updated by automated process. Provider to review     Closed fracture of proximal fibula 02/2023     No past surgical history on file.      ROS:  All other systems negative    Meds:    Current Outpatient Medications:      acetaminophen (TYLENOL) 500 MG tablet, Take 500-1,000 mg by mouth, Disp: , Rfl:      benzonatate (TESSALON) 100 MG capsule, TAKE 1 CAPSULE BY MOUTH THREE TIMES A DAY AS NEEDED FOR COUGH FOR UP TO 7 DAYS, Disp: , Rfl:      calcium carbonate 600 mg-vitamin D 400 units (CALTRATE) 600-400 MG-UNIT per tablet, Take 1 tablet by mouth 2 times daily., Disp: , Rfl:      diphenhydrAMINE (BENADRYL) 12.5 MG/5ML elixir, Take 5 ml daily for allergies, Disp: , Rfl:      losartan (COZAAR) 25 MG tablet, Take 1 tablet (25 mg) by mouth daily, Disp: 90 tablet, Rfl: 3     metoprolol succinate ER (TOPROL XL) 50 MG 24 hr tablet, TAKE 1 TABLET BY MOUTH TWICE  DAILY, Disp: 180 tablet, Rfl: 3     multivitamin w/minerals (THERA-VIT-M) tablet, Take 1 tablet by mouth daily., Disp: , Rfl:      simvastatin (ZOCOR) 10 MG tablet, Take 1 tablet (10 mg) by mouth At Bedtime, Disp: 90 tablet, Rfl: 3     timolol maleate (TIMOPTIC) 0.5 % ophthalmic solution, 1 drop, Disp: ,  Rfl:      vitamin E 400 units TABS, Take 400 Units by mouth daily, Disp: , Rfl:      White Petrolatum-Mineral Oil (WH PETROL-MINERAL OIL-LANOLIN) 0.1-0.1 % OINT, at bedtime as needed., Disp: , Rfl:         Social:  Social History     Socioeconomic History     Marital status:      Spouse name: Not on file     Number of children: Not on file     Years of education: Not on file     Highest education level: Not on file   Occupational History     Not on file   Tobacco Use     Smoking status: Never     Smokeless tobacco: Never   Vaping Use     Vaping Use: Never used   Substance and Sexual Activity     Alcohol use: Not Currently     Alcohol/week: 4.0 standard drinks of alcohol     Types: 4 Standard drinks or equivalent per week     Comment: 1 every other day     Drug use: Never     Sexual activity: Not Currently     Comment:  11/2019   Other Topics Concern     Not on file   Social History Narrative    She is . They do not have children. 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      Social Determinants of Health     Financial Resource Strain: Not on file   Food Insecurity: Not on file   Transportation Needs: Not on file   Physical Activity: Not on file   Stress: Not on file   Social Connections: Not on file   Intimate Partner Violence: Not on file   Housing Stability: Not on file         Physical Exam:  Vitals:    07/21/23 1347   BP: 127/80   BP Location: Right arm   Patient Position: Sitting   Cuff Size: Adult Regular   Pulse: 79   Resp: 18   Temp: 98.6  F (37  C)   TempSrc: Oral   SpO2: 98%   Weight: 56.7 kg (124 lb 14.4 oz)      Patient is awake, alert, no distress  Eyes: PERRL, EOMI  Head: Atraumatic and normocephalic  Pharynx: Clear, airway patent  Lungs: Inspiratory rhonchi and expiratory wheezes in the left lung, right lung is clear  Neuro: Normal motor and sensory function in all extremities, she walks with a little bit of a limp  after a recent leg fracture  Psych: Awake, alert, normally responsive      Results:    I reviewed the chest x-ray images and I do not see any infiltrates or other abnormalities.  Radiology interpretation is pending          Khoa Garner MD

## 2023-07-24 ENCOUNTER — OFFICE VISIT (OUTPATIENT)
Dept: FAMILY MEDICINE | Facility: CLINIC | Age: 73
End: 2023-07-24
Payer: COMMERCIAL

## 2023-07-24 VITALS
HEART RATE: 70 BPM | TEMPERATURE: 97.5 F | SYSTOLIC BLOOD PRESSURE: 136 MMHG | HEIGHT: 58 IN | OXYGEN SATURATION: 98 % | DIASTOLIC BLOOD PRESSURE: 68 MMHG | RESPIRATION RATE: 24 BRPM | BODY MASS INDEX: 26.6 KG/M2 | WEIGHT: 126.7 LBS

## 2023-07-24 DIAGNOSIS — M85.80 OSTEOPENIA, UNSPECIFIED LOCATION: Primary | ICD-10-CM

## 2023-07-24 DIAGNOSIS — Z91.89 HIGH RISK FOR HIP FRACTURE: ICD-10-CM

## 2023-07-24 DIAGNOSIS — R05.1 ACUTE COUGH: ICD-10-CM

## 2023-07-24 PROCEDURE — 82306 VITAMIN D 25 HYDROXY: CPT | Performed by: FAMILY MEDICINE

## 2023-07-24 PROCEDURE — 99214 OFFICE O/P EST MOD 30 MIN: CPT | Performed by: FAMILY MEDICINE

## 2023-07-24 PROCEDURE — 36415 COLL VENOUS BLD VENIPUNCTURE: CPT | Performed by: FAMILY MEDICINE

## 2023-07-24 ASSESSMENT — PAIN SCALES - GENERAL: PAINLEVEL: NO PAIN (0)

## 2023-07-24 NOTE — PATIENT INSTRUCTIONS
I will watch for your vitamin D level to return.  If this needs to be replaced, I will discuss how to do this.  If the vitamin D level is normal, I will send a prescription for Fosamax to your pharmacy.

## 2023-07-24 NOTE — PROGRESS NOTES
Assessment & Plan     Osteopenia, unspecified location  High risk for hip fracture  Will optimize vitamin D and then restart Fosamax.  Patient had used this in the distant past it sounds like, along with Prolia at one point.  Vitamin D level pending at time of note.  - Vitamin D Deficiency    Acute cough  Lungs sound good today.  Previous CXR was clear.  Interesting that no COVID test was done, but patient is now >1 week from the onset of her symptoms and out of treatment range.  Recommended completing antibiotics as prescribed, benzonatate in the AM and guaifenesin in the PM as she has been doing.  Follow up as needed if not improving as expected.                 Jennifer Hankins MD  [facility] CARSON Watkins is a 73 year old, presenting for the following health issues:  URI, Radiology Visit, and Recheck Medication      7/24/2023    10:05 AM   Additional Questions   Roomed by Sarah CARTER    History of Present Illness       Reason for visit:  Cough, Dexa Follow up  Symptom onset:  1-2 weeks ago  Symptoms include:  Cough, Back pain  Symptom intensity:  Mild  Symptom progression:  Improving  Had these symptoms before:  Yes  Has tried/received treatment for these symptoms:  Yes  Previous treatment was successful:  Yes  Prior treatment description:  Doxycycline, Benzonatate.    She eats 2-3 servings of fruits and vegetables daily.She consumes 0 sweetened beverage(s) daily.She exercises with enough effort to increase her heart rate 9 or less minutes per day.  She exercises with enough effort to increase her heart rate 3 or less days per week.   She is taking medications regularly.     Patient presents today for discussion of her bone density test.  She also happens to have a respiratory infection and was seen at walk-in clinic on 7/21/23.  She had a chest XR that was clear, but was started on doxycycline.  She thinks the cough is very slowly improving.  It is nonproductive.  She has been  "coughing so hard that she has some posterior ribcage pain.  She never had fever with this illness, does have runny/stuffy nose.  She was never a smoker and has no history of asthma.  She notes that the prescribed benzonatate keeps her up at night.    In terms of bone density, on her DEXA from 5/30/23 she had a hip fracture risk of 3.9% and osteopenia.  She has no trouble swallowing, never had any procedures to her esophagus or stomach.  Upon review of the EMR, she has used prolia and Fosamax in the past at one point.     We also briefly discussed alcohol use today.  She continues to drink 3-4 drinks per week, mainly because she is \"lonely and bored\".  We discussed ways to get involved in the community, like with a bible study group (she is [employer]), volunteer opportunities or community ed classes.              Review of Systems   Constitutional, HEENT, cardiovascular, pulmonary, gi and gu systems are negative, except as otherwise noted.      Objective    /68 (BP Location: Right arm, Patient Position: Sitting, Cuff Size: Adult Regular)   Pulse 70   Temp 97.5  F (36.4  C) (Temporal)   Resp 24   Ht 1.47 m (4' 9.87\")   Wt 57.5 kg (126 lb 11.2 oz)   SpO2 98%   BMI 26.60 kg/m    Body mass index is 26.6 kg/m .  Physical Exam   GENERAL: healthy, alert and no distress  HENT: ear canals and TM's normal, nose and mouth without ulcers or lesions  RESP: rhonchi that clear with cough, good air movement throughout, no wheeze  CV: regular rate and rhythm, normal S1 S2, no S3 or S4, no murmur, click or rub, no peripheral edema and peripheral pulses strong  MS: no gross musculoskeletal defects noted, no edema  NEURO: Normal strength and tone, mentation intact and speech normal  PSYCH: mentation appears normal, affect normal/bright    Diagnostics: Pending                  "

## 2023-07-25 LAB — DEPRECATED CALCIDIOL+CALCIFEROL SERPL-MC: 54 UG/L (ref 20–75)

## 2023-07-25 RX ORDER — ALENDRONATE SODIUM 70 MG/1
70 TABLET ORAL
Qty: 12 TABLET | Refills: 3 | Status: SHIPPED | OUTPATIENT
Start: 2023-07-25 | End: 2024-05-09

## 2023-10-23 ENCOUNTER — HOSPITAL ENCOUNTER (OUTPATIENT)
Dept: GENERAL RADIOLOGY | Facility: HOSPITAL | Age: 73
Discharge: HOME | End: 2023-10-23
Attending: PHYSICIAN ASSISTANT | Admitting: PHYSICIAN ASSISTANT
Payer: COMMERCIAL

## 2023-10-23 ENCOUNTER — OFFICE VISIT (OUTPATIENT)
Dept: FAMILY MEDICINE | Facility: CLINIC | Age: 73
End: 2023-10-23
Payer: COMMERCIAL

## 2023-10-23 VITALS
RESPIRATION RATE: 14 BRPM | HEART RATE: 67 BPM | OXYGEN SATURATION: 98 % | TEMPERATURE: 97.7 F | DIASTOLIC BLOOD PRESSURE: 80 MMHG | SYSTOLIC BLOOD PRESSURE: 148 MMHG

## 2023-10-23 DIAGNOSIS — I10 ESSENTIAL HYPERTENSION, BENIGN: ICD-10-CM

## 2023-10-23 DIAGNOSIS — R05.1 ACUTE COUGH: Primary | ICD-10-CM

## 2023-10-23 DIAGNOSIS — R05.1 ACUTE COUGH: ICD-10-CM

## 2023-10-23 PROCEDURE — 99214 OFFICE O/P EST MOD 30 MIN: CPT | Performed by: PHYSICIAN ASSISTANT

## 2023-10-23 PROCEDURE — 71046 X-RAY EXAM CHEST 2 VIEWS: CPT

## 2023-10-23 RX ORDER — DOXYCYCLINE 100 MG/1
100 TABLET ORAL DAILY
Qty: 10 TABLET | Refills: 0 | Status: SHIPPED | OUTPATIENT
Start: 2023-10-23 | End: 2023-11-02

## 2023-10-23 NOTE — PROGRESS NOTES
SUBJECTIVE:   June Quintero is a 73 year old female presenting with a chief complaint of   Chief Complaint   Patient presents with    Cold Symptoms     X 1-1.5 week. Wet cough. Not much appetite. Slight on and off headache. Chest congestion.       She is an established patient of [facility].    -sick for the past 10 days, cough worse in the past 3 days  -no fever  -occasionally HANDY, no CP  -was able to walk around the mall today for her exercise prior to her visit today    -tried Benadryl at bedtime, Cold Raghu    -pt is a non smoker, no hx of asthma or breathing disorders    Review of Systems    Past Medical History:   Diagnosis Date    Acute gastric ulcer 2013    Problem list name updated by automated process. Provider to review    Closed fracture of proximal fibula 2023     Family History   Problem Relation Age of Onset    Dementia Mother          age 82    Heart Disease Father          age 60- had a hole in heart    Parkinsonism Brother     Hearing Loss Brother     Hearing Loss Brother     Breast Cancer Sister 60        fraternal twin    Hearing Loss Sister     Diabetes Sister     Hearing Loss Sister     Colon Cancer No family hx of     Colorectal Cancer No family hx of     Coronary Artery Disease No family hx of      Current Outpatient Medications   Medication Sig Dispense Refill    acetaminophen (TYLENOL) 500 MG tablet Take 500-1,000 mg by mouth      alendronate (FOSAMAX) 70 MG tablet Take 1 tablet (70 mg) by mouth every 7 days 12 tablet 3    benzonatate (TESSALON) 100 MG capsule Take 1 capsule (100 mg) by mouth 3 times daily as needed for cough 21 capsule 0    calcium carbonate 600 mg-vitamin D 400 units (CALTRATE) 600-400 MG-UNIT per tablet Take 1 tablet by mouth 2 times daily.      diphenhydrAMINE (BENADRYL) 12.5 MG/5ML elixir Take 5 ml daily for allergies      losartan (COZAAR) 25 MG tablet Take 1 tablet (25 mg) by mouth daily 90 tablet 3    metoprolol succinate ER (TOPROL XL) 50 MG 24 hr  tablet TAKE 1 TABLET BY MOUTH TWICE  DAILY 180 tablet 3    multivitamin w/minerals (THERA-VIT-M) tablet Take 1 tablet by mouth daily.      simvastatin (ZOCOR) 10 MG tablet Take 1 tablet (10 mg) by mouth At Bedtime 90 tablet 3    timolol maleate (TIMOPTIC) 0.5 % ophthalmic solution 1 drop      vitamin E 400 units TABS Take 400 Units by mouth daily      White Petrolatum-Mineral Oil (WH PETROL-MINERAL OIL-LANOLIN) 0.1-0.1 % OINT at bedtime as needed.       Social History     Tobacco Use    Smoking status: Never    Smokeless tobacco: Never   Substance Use Topics    Alcohol use: Not Currently     Alcohol/week: 4.0 standard drinks of alcohol     Types: 4 Standard drinks or equivalent per week     Comment: 1 every other day       OBJECTIVE  BP (!) 174/85   Pulse 67   Temp 97.7  F (36.5  C) (Oral)   Resp 14   SpO2 98%     Physical Exam  Vitals reviewed.   Constitutional:       Appearance: Normal appearance.   HENT:      Right Ear: Tympanic membrane normal.      Left Ear: Tympanic membrane normal.      Mouth/Throat:      Mouth: Mucous membranes are moist.   Cardiovascular:      Rate and Rhythm: Normal rate and regular rhythm.   Pulmonary:      Breath sounds: Examination of the right-lower field reveals decreased breath sounds and rhonchi. Decreased breath sounds and rhonchi present.   Lymphadenopathy:      Cervical: No cervical adenopathy.   Neurological:      Mental Status: She is alert.         Labs:  No results found for this or any previous visit (from the past 24 hour(s)).    X-Ray was done, my findings are: cxr without acute infiltrate    ASSESSMENT:      ICD-10-CM    1. Acute cough  R05.1 XR Chest 2 Views      2. Essential hypertension, benign  I10            Medical Decision Making:    Differential Diagnosis:  URI Adult/Peds:  Bronchiolitis, Bronchitis-viral, Pneumonia, and Viral upper respiratory illness    Serious Comorbid Conditions:  Adult:   HTN    PLAN:    -pt had a similar illness in July which improved  with doxy  -considering her secondary worsening sxs, I recommend txt with doxycycline again.  -recommend monitoring BP and follow-up with pcp if bp continues to be above goal  -discussed alarm signs and symptoms to monitor for and discussed when to be reevaluated in the UC or ED     Followup:    If not improving or if condition worsens, follow up with your Primary Care Provider    There are no Patient Instructions on file for this visit.

## 2023-10-25 ENCOUNTER — TRANSFERRED RECORDS (OUTPATIENT)
Dept: HEALTH INFORMATION MANAGEMENT | Facility: CLINIC | Age: 73
End: 2023-10-25
Payer: COMMERCIAL

## 2023-11-21 ENCOUNTER — TELEPHONE (OUTPATIENT)
Dept: FAMILY MEDICINE | Facility: CLINIC | Age: 73
End: 2023-11-21
Payer: COMMERCIAL

## 2023-11-21 NOTE — TELEPHONE ENCOUNTER
Patient Quality Outreach    Patient is due for the following:   Hypertension -  BP check    Next Steps:   Schedule a nurse only visit for BP CHECK    Type of outreach:    Sent letter.      Questions for provider review:    None           Breanna Chavez

## 2024-01-07 DIAGNOSIS — E78.00 HYPERCHOLESTEREMIA: ICD-10-CM

## 2024-01-07 DIAGNOSIS — I10 ESSENTIAL HYPERTENSION WITH GOAL BLOOD PRESSURE LESS THAN 140/90: ICD-10-CM

## 2024-01-07 DIAGNOSIS — I10 ESSENTIAL HYPERTENSION, BENIGN: ICD-10-CM

## 2024-01-08 RX ORDER — SIMVASTATIN 10 MG
10 TABLET ORAL AT BEDTIME
Qty: 100 TABLET | Refills: 2 | OUTPATIENT
Start: 2024-01-08

## 2024-01-08 RX ORDER — LOSARTAN POTASSIUM 25 MG/1
25 TABLET ORAL DAILY
Qty: 100 TABLET | Refills: 2 | OUTPATIENT
Start: 2024-01-08

## 2024-01-08 RX ORDER — METOPROLOL SUCCINATE 50 MG/1
TABLET, EXTENDED RELEASE ORAL
Qty: 200 TABLET | Refills: 2 | OUTPATIENT
Start: 2024-01-08

## 2024-01-31 ENCOUNTER — TRANSFERRED RECORDS (OUTPATIENT)
Dept: HEALTH INFORMATION MANAGEMENT | Facility: CLINIC | Age: 74
End: 2024-01-31
Payer: COMMERCIAL

## 2024-03-04 DIAGNOSIS — I10 ESSENTIAL HYPERTENSION, BENIGN: ICD-10-CM

## 2024-03-04 DIAGNOSIS — E78.00 HYPERCHOLESTEREMIA: ICD-10-CM

## 2024-03-04 DIAGNOSIS — I10 ESSENTIAL HYPERTENSION WITH GOAL BLOOD PRESSURE LESS THAN 140/90: ICD-10-CM

## 2024-03-05 RX ORDER — LOSARTAN POTASSIUM 25 MG/1
25 TABLET ORAL DAILY
Qty: 90 TABLET | Refills: 0 | Status: SHIPPED | OUTPATIENT
Start: 2024-03-05 | End: 2024-05-15

## 2024-03-05 RX ORDER — SIMVASTATIN 10 MG
10 TABLET ORAL AT BEDTIME
Qty: 60 TABLET | Refills: 0 | Status: SHIPPED | OUTPATIENT
Start: 2024-03-05 | End: 2024-03-26

## 2024-03-05 RX ORDER — METOPROLOL SUCCINATE 50 MG/1
TABLET, EXTENDED RELEASE ORAL
Qty: 180 TABLET | Refills: 0 | Status: SHIPPED | OUTPATIENT
Start: 2024-03-05 | End: 2024-05-15

## 2024-03-26 DIAGNOSIS — E78.00 HYPERCHOLESTEREMIA: ICD-10-CM

## 2024-03-26 RX ORDER — SIMVASTATIN 10 MG
10 TABLET ORAL AT BEDTIME
Qty: 60 TABLET | Refills: 0 | Status: SHIPPED | OUTPATIENT
Start: 2024-03-26 | End: 2024-06-17

## 2024-04-22 ENCOUNTER — TRANSFERRED RECORDS (OUTPATIENT)
Dept: HEALTH INFORMATION MANAGEMENT | Facility: CLINIC | Age: 74
End: 2024-04-22
Payer: COMMERCIAL

## 2024-05-01 ENCOUNTER — ANCILLARY PROCEDURE (OUTPATIENT)
Dept: GENERAL RADIOLOGY | Facility: CLINIC | Age: 74
End: 2024-05-01
Attending: FAMILY MEDICINE
Payer: COMMERCIAL

## 2024-05-01 ENCOUNTER — OFFICE VISIT (OUTPATIENT)
Dept: FAMILY MEDICINE | Facility: CLINIC | Age: 74
End: 2024-05-01
Payer: COMMERCIAL

## 2024-05-01 VITALS
TEMPERATURE: 98 F | RESPIRATION RATE: 14 BRPM | WEIGHT: 130.5 LBS | BODY MASS INDEX: 27.39 KG/M2 | DIASTOLIC BLOOD PRESSURE: 74 MMHG | HEIGHT: 58 IN | HEART RATE: 72 BPM | SYSTOLIC BLOOD PRESSURE: 136 MMHG

## 2024-05-01 DIAGNOSIS — E78.2 MIXED HYPERLIPIDEMIA: ICD-10-CM

## 2024-05-01 DIAGNOSIS — Z00.00 ENCOUNTER FOR MEDICARE ANNUAL WELLNESS EXAM: Primary | ICD-10-CM

## 2024-05-01 DIAGNOSIS — R73.01 IMPAIRED FASTING GLUCOSE: ICD-10-CM

## 2024-05-01 DIAGNOSIS — M79.661 PAIN OF RIGHT LOWER LEG: ICD-10-CM

## 2024-05-01 DIAGNOSIS — I10 ESSENTIAL HYPERTENSION, BENIGN: ICD-10-CM

## 2024-05-01 DIAGNOSIS — M85.80 OSTEOPENIA WITH HIGH RISK OF FRACTURE: ICD-10-CM

## 2024-05-01 PROBLEM — D53.9 MACROCYTIC ANEMIA: Status: RESOLVED | Noted: 2023-04-22 | Resolved: 2024-05-01

## 2024-05-01 PROBLEM — Z91.89 HIGH RISK FOR HIP FRACTURE: Status: RESOLVED | Noted: 2023-07-24 | Resolved: 2024-05-01

## 2024-05-01 LAB
ERYTHROCYTE [DISTWIDTH] IN BLOOD BY AUTOMATED COUNT: 13 % (ref 10–15)
HBA1C MFR BLD: 5.2 % (ref 0–5.6)
HCT VFR BLD AUTO: 35.8 % (ref 35–47)
HGB BLD-MCNC: 11.7 G/DL (ref 11.7–15.7)
MCH RBC QN AUTO: 35.1 PG (ref 26.5–33)
MCHC RBC AUTO-ENTMCNC: 32.7 G/DL (ref 31.5–36.5)
MCV RBC AUTO: 108 FL (ref 78–100)
PLATELET # BLD AUTO: 188 10E3/UL (ref 150–450)
RBC # BLD AUTO: 3.33 10E6/UL (ref 3.8–5.2)
WBC # BLD AUTO: 7.5 10E3/UL (ref 4–11)

## 2024-05-01 PROCEDURE — 83036 HEMOGLOBIN GLYCOSYLATED A1C: CPT | Performed by: FAMILY MEDICINE

## 2024-05-01 PROCEDURE — 80061 LIPID PANEL: CPT | Performed by: FAMILY MEDICINE

## 2024-05-01 PROCEDURE — 85027 COMPLETE CBC AUTOMATED: CPT | Performed by: FAMILY MEDICINE

## 2024-05-01 PROCEDURE — 36415 COLL VENOUS BLD VENIPUNCTURE: CPT | Performed by: FAMILY MEDICINE

## 2024-05-01 PROCEDURE — 99214 OFFICE O/P EST MOD 30 MIN: CPT | Mod: 25 | Performed by: FAMILY MEDICINE

## 2024-05-01 PROCEDURE — G0439 PPPS, SUBSEQ VISIT: HCPCS | Performed by: FAMILY MEDICINE

## 2024-05-01 PROCEDURE — 84443 ASSAY THYROID STIM HORMONE: CPT | Performed by: FAMILY MEDICINE

## 2024-05-01 PROCEDURE — 80053 COMPREHEN METABOLIC PANEL: CPT | Performed by: FAMILY MEDICINE

## 2024-05-01 PROCEDURE — 73590 X-RAY EXAM OF LOWER LEG: CPT | Mod: TC | Performed by: RADIOLOGY

## 2024-05-01 SDOH — HEALTH STABILITY: PHYSICAL HEALTH: ON AVERAGE, HOW MANY DAYS PER WEEK DO YOU ENGAGE IN MODERATE TO STRENUOUS EXERCISE (LIKE A BRISK WALK)?: 3 DAYS

## 2024-05-01 ASSESSMENT — SOCIAL DETERMINANTS OF HEALTH (SDOH): HOW OFTEN DO YOU GET TOGETHER WITH FRIENDS OR RELATIVES?: TWICE A WEEK

## 2024-05-01 NOTE — PROGRESS NOTES
Preventive Care Visit  [facility] CARSON Hankins MD, Family Medicine  May 1, 2024      Assessment & Plan     Encounter for Medicare annual wellness exam  Routine Medicare Wellness visit, updated in EMR.  Non-fasting labs updated as below.  Immunizations are up to date with the exception of RSV, discussed obtaining this at her pharmacy.  She will be due for updated bone density testing in July.  Colon cancer screening is up to date, will be due for Cologuard 3/4/26.  Mammogram is due next month.  Plan repeat wellness check in 1 year.  - Comprehensive metabolic panel (BMP + Alb, Alk Phos, ALT, AST, Total. Bili, TP)  - CBC with platelets  - Lipid panel reflex to direct LDL Non-fasting  - TSH with free T4 reflex  - Hemoglobin A1c    Essential hypertension, benign  Reasonably well-controlled on losartan and metoprolol.  Home readings reflect this also.  Labs updated as below.  - Comprehensive metabolic panel (BMP + Alb, Alk Phos, ALT, AST, Total. Bili, TP)  - CBC with platelets  - TSH with free T4 reflex    Mixed hyperlipidemia  Doing well on simvastatin with the exception of elevated triglycerides.  Discussed increased vegetables and exercise.  - Comprehensive metabolic panel (BMP + Alb, Alk Phos, ALT, AST, Total. Bili, TP)  - Lipid panel reflex to direct LDL Non-fasting    Impaired fasting glucose  A1c remains in normal range.  - Comprehensive metabolic panel (BMP + Alb, Alk Phos, ALT, AST, Total. Bili, TP)  - Hemoglobin A1c    Osteopenia with high risk of fracture  Continues on Fosamax.  Will be due for updated bone density testing in July.  Reminder set to order this at that time.  - Comprehensive metabolic panel (BMP + Alb, Alk Phos, ALT, AST, Total. Bili, TP)  - TSH with free T4 reflex    Pain of right lower leg  XR unrevealing with the exception of evidence of the old healed fracture.  Recommended visit with Ortho if this remains significantly bothersome.  Awaiting response from patient in  "this regard.  - XR Tibia and Fibula Right 2 Views; Future              BMI  Estimated body mass index is 27.75 kg/m  as calculated from the following:    Height as of this encounter: 1.461 m (4' 9.5\").    Weight as of this encounter: 59.2 kg (130 lb 8 oz).       Counseling  Appropriate preventive services were discussed with this patient, including applicable screening as appropriate for fall prevention, nutrition, physical activity, Tobacco-use cessation, weight loss and cognition.  Checklist reviewing preventive services available has been given to the patient.  Reviewed patient's diet, addressing concerns and/or questions.   She is at risk for lack of exercise and has been provided with information to increase physical activity for the benefit of her well-being.   She is at risk for psychosocial distress and has been provided with information to reduce risk.   Discussed possible causes of fatigue. Information on urinary incontinence and treatment options given to patient.           Yarely Watkins is a 74 year old, presenting for the following:  Physical and Recheck Medication        5/1/2024     1:59 PM   Additional Questions   Roomed by JENNIFER Gomez   Accompanied by alone        Health Care Directive  Patient does not have a Health Care Directive or Living Will: Discussed advance care planning with patient; information given to patient to review.    HPI    Pain in the right lower leg.  Had a proximal fibula fracture 2/2023 that didn't require any surgical intervention.  Tried a CAM boot temporarily.  Felt better for a time, now worse again.  Wondering about a handicap parking permit for this issue.            5/1/2024   General Health   How would you rate your overall physical health? Excellent   Feel stress (tense, anxious, or unable to sleep) Only a little   (!) STRESS CONCERN      5/1/2024   Nutrition   Diet: Low salt    Low fat/cholesterol         5/1/2024   Exercise   Days per week of moderate/strenous " exercise 3 days         5/1/2024   Social Factors   Frequency of gathering with friends or relatives Twice a week   Worry food won't last until get money to buy more No   Food not last or not have enough money for food? No   Do you have housing?  Yes   Are you worried about losing your housing? No   Lack of transportation? No   Unable to get utilities (heat,electricity)? No         5/1/2024   Fall Risk   Fallen 2 or more times in the past year? No   Trouble with walking or balance? No          5/1/2024   Activities of Daily Living- Home Safety   Needs help with the following daily activites None of the above   Safety concerns in the home None of the above         5/1/2024   Dental   Dentist two times every year? (!) DECLINE         5/1/2024   Hearing Screening   Hearing concerns? None of the above         5/1/2024   Driving Risk Screening   Patient/family members have concerns about driving No         5/1/2024   General Alertness/Fatigue Screening   Have you been more tired than usual lately? (!) YES         5/1/2024   Urinary Incontinence Screening   Bothered by leaking urine in past 6 months Yes         5/1/2024   TB Screening   Were you born outside of the [place]? No         Today's PHQ-2 Score:       5/1/2024     1:59 PM   PHQ-2 ( 1999 [employer])   Q1: Little interest or pleasure in doing things 0   Q2: Feeling down, depressed or hopeless 0   PHQ-2 Score 0   Q1: Little interest or pleasure in doing things Not at all   Q2: Feeling down, depressed or hopeless Not at all   PHQ-2 Score 0           5/1/2024   Substance Use   Alcohol more than 3/day or more than 7/wk No   Do you have a current opioid prescription? No   How severe/bad is pain from 1 to 10? 2/10   Do you use any other substances recreationally? No     Social History     Tobacco Use    Smoking status: Never    Smokeless tobacco: Never   Vaping Use    Vaping status: Never Used   Substance Use Topics    Alcohol use: Not Currently     Alcohol/week: 4.0 standard  drinks of alcohol     Types: 4 Standard drinks or equivalent per week     Comment: 1 every other day    Drug use: Never           5/30/2023   LAST FHS-7 RESULTS   1st degree relative breast or ovarian cancer Yes   Any relative bilateral breast cancer No   Any male have breast cancer No   Any ONE woman have BOTH breast AND ovarian cancer No   Any woman with breast cancer before 50yrs No   2 or more relatives with breast AND/OR ovarian cancer No   2 or more relatives with breast AND/OR bowel cancer No       Mammogram Screening - Mammogram every 1-2 years updated in Health Maintenance based on mutual decision making    ASCVD Risk   The 10-year ASCVD risk score (Alexis BARTLETT, et al., 2019) is: 18.7%    Values used to calculate the score:      Age: 74 years      Sex: Female      Is Non- : No      Diabetic: No      Tobacco smoker: No      Systolic Blood Pressure: 136 mmHg      Is BP treated: Yes      HDL Cholesterol: 84 mg/dL      Total Cholesterol: 202 mg/dL            Reviewed and updated as needed this visit by Provider   Tobacco  Allergies  Meds  Problems  Med Hx  Surg Hx  Fam Hx  Soc   Hx Sexual Activity          Past Medical History:   Diagnosis Date    Acute gastric ulcer 02/19/2013    Problem list name updated by automated process. Provider to review    Closed fracture of proximal fibula 02/2023    Macrocytic anemia 04/22/2023     History reviewed. No pertinent surgical history.  OB History   No obstetric history on file.     BP Readings from Last 3 Encounters:   05/01/24 136/74   10/23/23 (!) 148/80   07/24/23 136/68    Wt Readings from Last 3 Encounters:   05/01/24 59.2 kg (130 lb 8 oz)   07/24/23 57.5 kg (126 lb 11.2 oz)   07/21/23 56.7 kg (124 lb 14.4 oz)                  Patient Active Problem List   Diagnosis    Allergic rhinitis    Essential hypertension, benign    Hyperlipidemia    Osteopenia with high risk of fracture    Leg length discrepancy    Alcohol use disorder,  mild, abuse    Adjustment disorder with anxious mood    Impaired fasting glucose    Postmenopausal status    Pain of right lower leg     History reviewed. No pertinent surgical history.    Social History     Tobacco Use    Smoking status: Never    Smokeless tobacco: Never   Substance Use Topics    Alcohol use: Not Currently     Comment: 1-2 every 3 days     Family History   Problem Relation Age of Onset    Dementia Mother          age 82    Heart Disease Father          age 60- had a hole in heart    Parkinsonism Brother     Hearing Loss Brother     Hearing Loss Brother     Breast Cancer Sister 60        fraternal twin    Hearing Loss Sister     Diabetes Sister     Hearing Loss Sister     Colon Cancer No family hx of     Colorectal Cancer No family hx of     Coronary Artery Disease No family hx of          Current Outpatient Medications   Medication Sig Dispense Refill    acetaminophen (TYLENOL) 500 MG tablet Take 500-1,000 mg by mouth      alendronate (FOSAMAX) 70 MG tablet Take 1 tablet (70 mg) by mouth every 7 days 12 tablet 3    calcium carbonate 600 mg-vitamin D 400 units (CALTRATE) 600-400 MG-UNIT per tablet Take 1 tablet by mouth 2 times daily.      diphenhydrAMINE (BENADRYL) 12.5 MG/5ML elixir Take 5 ml daily for allergies      losartan (COZAAR) 25 MG tablet TAKE 1 TABLET BY MOUTH DAILY 90 tablet 0    metoprolol succinate ER (TOPROL XL) 50 MG 24 hr tablet TAKE 1 TABLET BY MOUTH TWICE  DAILY 180 tablet 0    multivitamin w/minerals (THERA-VIT-M) tablet Take 1 tablet by mouth daily.      simvastatin (ZOCOR) 10 MG tablet Take 1 tablet (10 mg) by mouth at bedtime +Appointment needed for further refills+ 60 tablet 0    timolol maleate (TIMOPTIC) 0.5 % ophthalmic solution 1 drop      vitamin E 400 units TABS Take 400 Units by mouth daily      White Petrolatum-Mineral Oil (WH PETROL-MINERAL OIL-LANOLIN) 0.1-0.1 % OINT at bedtime as needed.       Allergies   Allergen Reactions    Ibuprofen Swelling     Throat  "swelling    Aspirin     Garlic Other (See Comments)     \"Very nauseous\"    Ibuprofen-Diphenhydramine Cit     Penicillins     Sulfa Antibiotics     Atorvastatin Nausea     Current providers sharing in care for this patient include:  Patient Care Team:  Jennifer Hankins MD as PCP - General  Jennifer Hankins MD as Assigned PCP    The following health maintenance items are reviewed in Epic and correct as of today:  Health Maintenance   Topic Date Due    ANNUAL REVIEW OF HM ORDERS  Never done    RSV VACCINE (Pregnancy & 60+) (1 - 1-dose 60+ series) Never done    LIPID  04/19/2024    MEDICARE ANNUAL WELLNESS VISIT  04/19/2024    FALL RISK ASSESSMENT  05/01/2025    MAMMO SCREENING  05/30/2025    COLORECTAL CANCER SCREENING  03/04/2026    GLUCOSE  04/19/2026    ADVANCE CARE PLANNING  04/22/2028    DTAP/TDAP/TD IMMUNIZATION (9 - Td or Tdap) 08/10/2031    DEXA  05/30/2038    HEPATITIS C SCREENING  Completed    PHQ-2 (once per calendar year)  Completed    INFLUENZA VACCINE  Completed    Pneumococcal Vaccine: 65+ Years  Completed    ZOSTER IMMUNIZATION  Completed    COVID-19 Vaccine  Completed    IPV IMMUNIZATION  Aged Out    HPV IMMUNIZATION  Aged Out    MENINGITIS IMMUNIZATION  Aged Out    RSV MONOCLONAL ANTIBODY  Aged Out         Review of Systems  Constitutional, HEENT, cardiovascular, pulmonary, GI, , musculoskeletal, neuro, skin, endocrine and psych systems are negative, except as otherwise noted.     Objective    Exam  /74   Pulse 72   Temp 98  F (36.7  C) (Axillary)   Resp 14   Ht 1.461 m (4' 9.5\")   Wt 59.2 kg (130 lb 8 oz)   BMI 27.75 kg/m     Estimated body mass index is 27.75 kg/m  as calculated from the following:    Height as of this encounter: 1.461 m (4' 9.5\").    Weight as of this encounter: 59.2 kg (130 lb 8 oz).    Physical Exam  GENERAL: alert and no distress  EYES: Eyes grossly normal to inspection, PERRL and conjunctivae and sclerae normal  HENT: ear canals and TM's normal, nose and " mouth without ulcers or lesions  NECK: no adenopathy, no asymmetry, masses, or scars  RESP: lungs clear to auscultation - no rales, rhonchi or wheezes  BREAST: normal without masses, tenderness or nipple discharge and no palpable axillary masses or adenopathy  CV: regular rate and rhythm, normal S1 S2, no S3 or S4, no murmur, click or rub, no peripheral edema  ABDOMEN: soft, nontender, no hepatosplenomegaly, no masses and bowel sounds normal  MS: no gross musculoskeletal defects noted, no edema  SKIN: no suspicious lesions or rashes  NEURO: Normal strength and tone, mentation intact and speech normal  PSYCH: mentation appears normal, affect normal/bright        5/1/2024   Mini Cog   Clock Draw Score 2 Normal   3 Item Recall 3 objects recalled   Mini Cog Total Score 5         Vision Screen  Reason Vision Screen Not Completed: Patient had exam in last 12 months      Signed Electronically by: Jennifer Hankins MD

## 2024-05-01 NOTE — LETTER
May 3, 2024      June Quintero  [address]  [place] [address]        Dear ,    We are writing to inform you of your test results.    Your kidney and liver function look good!  Great job cutting back on alcohol.  Your cholesterol looks good with the exception of the triglycerides.  Try to eat a vegetable-rich diet and move more.  Blood counts are stable, thyroid hormone level is normal.  A1c to screen for diabetes is normal.    Resulted Orders   Comprehensive metabolic panel (BMP + Alb, Alk Phos, ALT, AST, Total. Bili, TP)   Result Value Ref Range    Sodium 135 135 - 145 mmol/L      Comment:      Reference intervals for this test were updated on 09/26/2023 to more accurately reflect our healthy population. There may be differences in the flagging of prior results with similar values performed with this method. Interpretation of those prior results can be made in the context of the updated reference intervals.     Potassium 4.6 3.4 - 5.3 mmol/L    Carbon Dioxide (CO2) 24 22 - 29 mmol/L    Anion Gap 12 7 - 15 mmol/L    Urea Nitrogen 18.4 8.0 - 23.0 mg/dL    Creatinine 0.94 0.51 - 0.95 mg/dL    GFR Estimate 64 >60 mL/min/1.73m2    Calcium 9.6 8.8 - 10.2 mg/dL    Chloride 99 98 - 107 mmol/L    Glucose 111 (H) 70 - 99 mg/dL    Alkaline Phosphatase 100 40 - 150 U/L      Comment:      Reference intervals for this test were updated on 11/14/2023 to more accurately reflect our healthy population. There may be differences in the flagging of prior results with similar values performed with this method. Interpretation of those prior results can be made in the context of the updated reference intervals.    AST 38 0 - 45 U/L      Comment:      Reference intervals for this test were updated on 6/12/2023 to more accurately reflect our healthy population. There may be differences in the flagging of prior results with similar values performed with this method. Interpretation of those prior results can  be made in the context of the updated reference intervals.    ALT 33 0 - 50 U/L      Comment:      Reference intervals for this test were updated on 6/12/2023 to more accurately reflect our healthy population. There may be differences in the flagging of prior results with similar values performed with this method. Interpretation of those prior results can be made in the context of the updated reference intervals.      Protein Total 7.9 6.4 - 8.3 g/dL    Albumin 4.8 3.5 - 5.2 g/dL    Bilirubin Total 0.4 <=1.2 mg/dL   CBC with platelets   Result Value Ref Range    WBC Count 7.5 4.0 - 11.0 10e3/uL    RBC Count 3.33 (L) 3.80 - 5.20 10e6/uL    Hemoglobin 11.7 11.7 - 15.7 g/dL    Hematocrit 35.8 35.0 - 47.0 %     (H) 78 - 100 fL    MCH 35.1 (H) 26.5 - 33.0 pg    MCHC 32.7 31.5 - 36.5 g/dL    RDW 13.0 10.0 - 15.0 %    Platelet Count 188 150 - 450 10e3/uL   Lipid panel reflex to direct LDL Non-fasting   Result Value Ref Range    Cholesterol 212 (H) <200 mg/dL    Triglycerides 263 (H) <150 mg/dL    Direct Measure HDL 83 >=50 mg/dL    LDL Cholesterol Calculated 76 <=100 mg/dL    Non HDL Cholesterol 129 <130 mg/dL    Patient Fasting > 8hrs? No     Narrative    Cholesterol  Desirable:  <200 mg/dL    Triglycerides  Normal:  Less than 150 mg/dL  Borderline High:  150-199 mg/dL  High:  200-499 mg/dL  Very High:  Greater than or equal to 500 mg/dL    Direct Measure HDL  Female:  Greater than or equal to 50 mg/dL   Male:  Greater than or equal to 40 mg/dL    LDL Cholesterol  Desirable:  <100mg/dL  Above Desirable:  100-129 mg/dL   Borderline High:  130-159 mg/dL   High:  160-189 mg/dL   Very High:  >= 190 mg/dL    Non HDL Cholesterol  Desirable:  130 mg/dL  Above Desirable:  130-159 mg/dL  Borderline High:  160-189 mg/dL  High:  190-219 mg/dL  Very High:  Greater than or equal to 220 mg/dL   TSH with free T4 reflex   Result Value Ref Range    TSH 1.03 0.30 - 4.20 uIU/mL   Hemoglobin A1c   Result Value Ref Range    Hemoglobin  A1C 5.2 0.0 - 5.6 %      Comment:      Normal <5.7%   Prediabetes 5.7-6.4%    Diabetes 6.5% or higher     Note: Adopted from ADA consensus guidelines.       If you have any questions or concerns, please call the clinic at the number listed above.       Sincerely,      Jennifer Hankins MD

## 2024-05-01 NOTE — PATIENT INSTRUCTIONS
Preventive Care Advice   This is general advice given by our system to help you stay healthy. However, your care team may have specific advice just for you. Please talk to your care team about your preventive care needs.  Nutrition  Eat 5 or more servings of fruits and vegetables each day.  Try wheat bread, brown rice and whole grain pasta (instead of white bread, rice, and pasta).  Get enough calcium and vitamin D. Check the label on foods and aim for 100% of the RDA (recommended daily allowance).  Lifestyle  Exercise at least 150 minutes each week   (30 minutes a day, 5 days a week).  Do muscle strengthening activities 2 days a week. These help control your weight and prevent disease.  No smoking.  Wear sunscreen to prevent skin cancer.  Have a dental exam and cleaning every 6 months.  Yearly exams  See your health care team every year to talk about:  Any changes in your health.  Any medicines your care team has prescribed.  Preventive care, family planning, and ways to prevent chronic diseases.  Shots (vaccines)   HPV shots (up to age 26), if you've never had them before.  Hepatitis B shots (up to age 59), if you've never had them before.  COVID-19 shot: Get this shot when it's due.  Flu shot: Get a flu shot every year.  Tetanus shot: Get a tetanus shot every 10 years.  Pneumococcal, hepatitis A, and RSV shots: Ask your care team if you need these based on your risk.  Shingles shot (for age 50 and up).  General health tests  Diabetes screening:  Starting at age 35, Get screened for diabetes at least every 3 years.  If you are younger than age 35, ask your care team if you should be screened for diabetes.  Cholesterol test: At age 39, start having a cholesterol test every 5 years, or more often if advised.  Bone density scan (DEXA): At age 50, ask your care team if you should have this scan for osteoporosis (brittle bones).  Hepatitis C: Get tested at least once in your life.  STIs (sexually transmitted  infections)  Before age 24: Ask your care team if you should be screened for STIs.  After age 24: Get screened for STIs if you're at risk. You are at risk for STIs (including HIV) if:  You are sexually active with more than one person.  You don't use condoms every time.  You or a partner was diagnosed with a sexually transmitted infection.  If you are at risk for HIV, ask about PrEP medicine to prevent HIV.  Get tested for HIV at least once in your life, whether you are at risk for HIV or not.  Cancer screening tests  Cervical cancer screening: If you have a cervix, begin getting regular cervical cancer screening tests at age 21. Most people who have regular screenings with normal results can stop after age 65. Talk about this with your provider.  Breast cancer scan (mammogram): If you've ever had breasts, begin having regular mammograms starting at age 40. This is a scan to check for breast cancer.  Colon cancer screening: It is important to start screening for colon cancer at age 45.  Have a colonoscopy test every 10 years (or more often if you're at risk) Or, ask your provider about stool tests like a FIT test every year or Cologuard test every 3 years.  To learn more about your testing options, visit: [email or web address].  For help making a decision, visit: https://bit.ly/pu61200.  Prostate cancer screening test: If you have a prostate and are age 55 to 69, ask your provider if you would benefit from a yearly prostate cancer screening test.  Lung cancer screening: If you are a current or former smoker age 50 to 80, ask your care team if ongoing lung cancer screenings are right for you.  For informational purposes only. Not to replace the advice of your health care provider. Copyright   2023 [employer]. All rights reserved. Clinically reviewed by the [facility]. [place] 612003 - REV 01/24.    Learning About Stress  What is stress?     Stress is your  body's response to a hard situation. Your body can have a physical, emotional, or mental response. Stress is a fact of life for most people, and it affects everyone differently. What causes stress for you may not be stressful for someone else.  A lot of things can cause stress. You may feel stress when you go on a job interview, take a test, or run a race. This kind of short-term stress is normal and even useful. It can help you if you need to work hard or react quickly. For example, stress can help you finish an important job on time.  Long-term stress is caused by ongoing stressful situations or events. Examples of long-term stress include long-term health problems, ongoing problems at work, or conflicts in your family. Long-term stress can harm your health.  How does stress affect your health?  When you are stressed, your body responds as though you are in danger. It makes hormones that speed up your heart, make you breathe faster, and give you a burst of energy. This is called the fight-or-flight stress response. If the stress is over quickly, your body goes back to normal and no harm is done.  But if stress happens too often or lasts too long, it can have bad effects. Long-term stress can make you more likely to get sick, and it can make symptoms of some diseases worse. If you tense up when you are stressed, you may develop neck, shoulder, or low back pain. Stress is linked to high blood pressure and heart disease.  Stress also harms your emotional health. It can make you ríos, tense, or depressed. Your relationships may suffer, and you may not do well at work or school.  What can you do to manage stress?  You can try these things to help manage stress:   Do something active. Exercise or activity can help reduce stress. Walking is a great way to get started. Even everyday activities such as housecleaning or yard work can help.  Try yoga or dong chi. These techniques combine exercise and meditation. You may need  some training at first to learn them.  Do something you enjoy. For example, listen to music or go to a movie. Practice your hobby or do volunteer work.  Meditate. This can help you relax, because you are not worrying about what happened before or what may happen in the future.  Do guided imagery. Imagine yourself in any setting that helps you feel calm. You can use online videos, books, or a teacher to guide you.  Do breathing exercises. For example:  From a standing position, bend forward from the waist with your knees slightly bent. Let your arms dangle close to the floor.  Breathe in slowly and deeply as you return to a standing position. Roll up slowly and lift your head last.  Hold your breath for just a few seconds in the standing position.  Breathe out slowly and bend forward from the waist.  Let your feelings out. Talk, laugh, cry, and express anger when you need to. Talking with supportive friends or family, a counselor, or a misael leader about your feelings is a healthy way to relieve stress. Avoid discussing your feelings with people who make you feel worse.  Write. It may help to write about things that are bothering you. This helps you find out how much stress you feel and what is causing it. When you know this, you can find better ways to cope.  What can you do to prevent stress?  You might try some of these things to help prevent stress:  Manage your time. This helps you find time to do the things you want and need to do.  Get enough sleep. Your body recovers from the stresses of the day while you are sleeping.  Get support. Your family, friends, and community can make a difference in how you experience stress.  Limit your news feed. Avoid or limit time on social media or news that may make you feel stressed.  Do something active. Exercise or activity can help reduce stress. Walking is a great way to get started.  Where can you learn more?  Go to https://www.healthwise.net/patiented  Enter N032 in the  "search box to learn more about \"Learning About Stress.\"  Current as of: October 24, 2023               Content Version: 14.0    1669-1028 [employer].   Care instructions adapted under license by your healthcare professional. If you have questions about a medical condition or this instruction, always ask your healthcare professional. [employer] disclaims any warranty or liability for your use of this information.      Learning About Sleeping Well  What does sleeping well mean?     Sleeping well means getting enough sleep to feel good and stay healthy. How much sleep is enough varies among people.  The number of hours you sleep and how you feel when you wake up are both important. If you do not feel refreshed, you probably need more sleep. Another sign of not getting enough sleep is feeling tired during the day.  Experts recommend that adults get at least 7 or more hours of sleep per day. Children and older adults need more sleep.  Why is getting enough sleep important?  Getting enough quality sleep is a basic part of good health. When your sleep suffers, your physical health, mood, and your thoughts can suffer too. You may find yourself feeling more grumpy or stressed. Not getting enough sleep also can lead to serious problems, including injury, accidents, anxiety, and depression.  What might cause poor sleeping?  Many things can cause sleep problems, including:  Changes to your sleep schedule.  Stress. Stress can be caused by fear about a single event, such as giving a speech. Or you may have ongoing stress, such as worry about work or school.  Depression, anxiety, and other mental or emotional conditions.  Changes in your sleep habits or surroundings. This includes changes that happen where you sleep, such as noise, light, or sleeping in a different bed. It also includes changes in your sleep pattern, such as having jet lag or working a late shift.  Health problems, such as pain, " "breathing problems, and restless legs syndrome.  Lack of regular exercise.  Using alcohol, nicotine, or caffeine before bed.  How can you help yourself?  Here are some tips that may help you sleep more soundly and wake up feeling more refreshed.  Your sleeping area   Use your bedroom only for sleeping and sex. A bit of light reading may help you fall asleep. But if it doesn't, do your reading elsewhere in the house. Try not to use your TV, computer, smartphone, or tablet while you are in bed.  Be sure your bed is big enough to stretch out comfortably, especially if you have a sleep partner.  Keep your bedroom quiet, dark, and cool. Use curtains, blinds, or a sleep mask to block out light. To block out noise, use earplugs, soothing music, or a \"white noise\" machine.  Your evening and bedtime routine   Create a relaxing bedtime routine. You might want to take a warm shower or bath, or listen to soothing music.  Go to bed at the same time every night. And get up at the same time every morning, even if you feel tired.  What to avoid   Limit caffeine (coffee, tea, caffeinated sodas) during the day, and don't have any for at least 6 hours before bedtime.  Avoid drinking alcohol before bedtime. Alcohol can cause you to wake up more often during the night.  Try not to smoke or use tobacco, especially in the evening. Nicotine can keep you awake.  Limit naps during the day, especially close to bedtime.  Avoid lying in bed awake for too long. If you can't fall asleep or if you wake up in the middle of the night and can't get back to sleep within about 20 minutes, get out of bed and go to another room until you feel sleepy.  Avoid taking medicine right before bed that may keep you awake or make you feel hyper or energized. Your doctor can tell you if your medicine may do this and if you can take it earlier in the day.  If you can't sleep   Imagine yourself in a peaceful, pleasant scene. Focus on the details and feelings of " "being in a place that is relaxing.  Get up and do a quiet or boring activity until you feel sleepy.  Avoid drinking any liquids before going to bed to help prevent waking up often to use the bathroom.  Where can you learn more?  Go to https://www."[employer].net/patiented  Enter J942 in the search box to learn more about \"Learning About Sleeping Well.\"  Current as of: July 10, 2023               Content Version: 14.0    5270-9578 [employer].   Care instructions adapted under license by your healthcare professional. If you have questions about a medical condition or this instruction, always ask your healthcare professional. [employer] disclaims any warranty or liability for your use of this information.      Bladder Training: Care Instructions  Your Care Instructions     Bladder training is used to treat urge incontinence and stress incontinence. Urge incontinence means that the need to urinate comes on so fast that you can't get to a toilet in time. Stress incontinence means that you leak urine because of pressure on your bladder. For example, it may happen when you laugh, cough, or lift something heavy.  Bladder training can increase how long you can wait before you have to urinate. It can also help your bladder hold more urine. And it can give you better control over the urge to urinate.  It is important to remember that bladder training takes a few weeks to a few months to make a difference. You may not see results right away, but don't give up.  Follow-up care is a key part of your treatment and safety. Be sure to make and go to all appointments, and call your doctor if you are having problems. It's also a good idea to know your test results and keep a list of the medicines you take.  How can you care for yourself at home?  Work with your doctor to come up with a bladder training program that is right for you. You may use one or more of the following methods.  Delayed urination  In the " "beginning, try to keep from urinating for 5 minutes after you first feel the need to go.  While you wait, take deep, slow breaths to relax. Kegel exercises can also help you delay the need to go to the bathroom.  After some practice, when you can easily wait 5 minutes to urinate, try to wait 10 minutes before you urinate.  Slowly increase the waiting period until you are able to control when you have to urinate.  Scheduled urination  Empty your bladder when you first wake up in the morning.  Schedule times throughout the day when you will urinate.  Start by going to the bathroom every hour, even if you don't need to go.  Slowly increase the time between trips to the bathroom.  When you have found a schedule that works well for you, keep doing it.  If you wake up during the night and have to urinate, do it. Apply your schedule to waking hours only.  Kegel exercises  These tighten and strengthen pelvic muscles, which can help you control the flow of urine. (If doing these exercises causes pain, stop doing them and talk with your doctor.) To do Kegel exercises:  Squeeze your muscles as if you were trying not to pass gas. Or squeeze your muscles as if you were stopping the flow of urine. Your belly, legs, and buttocks shouldn't move.  Hold the squeeze for 3 seconds, then relax for 5 to 10 seconds.  Start with 3 seconds, then add 1 second each week until you are able to squeeze for 10 seconds.  Repeat the exercise 10 times a session. Do 3 to 8 sessions a day.  When should you call for help?  Watch closely for changes in your health, and be sure to contact your doctor if:    Your incontinence is getting worse.     You do not get better as expected.   Where can you learn more?  Go to https://www.[employer].net/patiented  Enter V684 in the search box to learn more about \"Bladder Training: Care Instructions.\"  Current as of: November 15, 2023               Content Version: 14.0    8556-7570 [employer].   Care " instructions adapted under license by your healthcare professional. If you have questions about a medical condition or this instruction, always ask your healthcare professional. [employer] disclaims any warranty or liability for your use of this information.

## 2024-05-03 LAB
ALBUMIN SERPL BCG-MCNC: 4.8 G/DL (ref 3.5–5.2)
ALP SERPL-CCNC: 100 U/L (ref 40–150)
ALT SERPL W P-5'-P-CCNC: 33 U/L (ref 0–50)
ANION GAP SERPL CALCULATED.3IONS-SCNC: 12 MMOL/L (ref 7–15)
AST SERPL W P-5'-P-CCNC: 38 U/L (ref 0–45)
BILIRUB SERPL-MCNC: 0.4 MG/DL
BUN SERPL-MCNC: 18.4 MG/DL (ref 8–23)
CALCIUM SERPL-MCNC: 9.6 MG/DL (ref 8.8–10.2)
CHLORIDE SERPL-SCNC: 99 MMOL/L (ref 98–107)
CHOLEST SERPL-MCNC: 212 MG/DL
CREAT SERPL-MCNC: 0.94 MG/DL (ref 0.51–0.95)
DEPRECATED HCO3 PLAS-SCNC: 24 MMOL/L (ref 22–29)
EGFRCR SERPLBLD CKD-EPI 2021: 64 ML/MIN/1.73M2
FASTING STATUS PATIENT QL REPORTED: NO
GLUCOSE SERPL-MCNC: 111 MG/DL (ref 70–99)
HDLC SERPL-MCNC: 83 MG/DL
LDLC SERPL CALC-MCNC: 76 MG/DL
NONHDLC SERPL-MCNC: 129 MG/DL
POTASSIUM SERPL-SCNC: 4.6 MMOL/L (ref 3.4–5.3)
PROT SERPL-MCNC: 7.9 G/DL (ref 6.4–8.3)
SODIUM SERPL-SCNC: 135 MMOL/L (ref 135–145)
TRIGL SERPL-MCNC: 263 MG/DL
TSH SERPL DL<=0.005 MIU/L-ACNC: 1.03 UIU/ML (ref 0.3–4.2)

## 2024-05-06 ENCOUNTER — TELEPHONE (OUTPATIENT)
Dept: FAMILY MEDICINE | Facility: CLINIC | Age: 74
End: 2024-05-06
Payer: COMMERCIAL

## 2024-05-06 DIAGNOSIS — M79.661 PAIN OF RIGHT LOWER LEG: Primary | ICD-10-CM

## 2024-05-06 NOTE — TELEPHONE ENCOUNTER
----- Message from Jennifer Hankins MD sent at 5/2/2024  8:21 PM CDT -----  Please call patient:  The Xray shows a well-healed fracture of the fibula, but no other bony abnormality.  If you are having significant pain, I would suggest another opinion with Ortho.  Let me know if you are ok with this and I will place a referral.  CLAUDE Hankins MD

## 2024-05-06 NOTE — TELEPHONE ENCOUNTER
Patient just walked around the top floor of [facility] (2,740 steps today) today at the mall and outside. Last night she did not have as much pain (3,872 steps). She is still having pain in the evening. She is taking two tylenol before bed and one in the middle of the night. She takes two more in the morning. She is wondering if she should still be icing the area. What do you think would be appropriate for this? Right now she stated her pain in minimal, manageable at the moment. She stated if she does more walking the pain level will go up.

## 2024-05-06 NOTE — TELEPHONE ENCOUNTER
"Per 5/1 notes,   \"Pain of right lower leg  XR unrevealing with the exception of evidence of the old healed fracture.  Recommended visit with Ortho if this remains significantly bothersome.  Awaiting response from patient in this regard.\"    Please see patient response/update below.     Dolly Thomas RN    "

## 2024-05-06 NOTE — TELEPHONE ENCOUNTER
I would like her to be able to walk longer distances for exercise.  I will place a referral to orthopedics.  She should be hearing from them to schedule.

## 2024-05-08 DIAGNOSIS — Z91.89 HIGH RISK FOR HIP FRACTURE: ICD-10-CM

## 2024-05-08 DIAGNOSIS — M85.80 OSTEOPENIA, UNSPECIFIED LOCATION: ICD-10-CM

## 2024-05-08 NOTE — PROGRESS NOTES
ASSESSMENT & PLAN       Today we discussed the underlying etiology/pathology of patient's   1. Chronic pain of right knee    2. Baker cyst, right knee    3. Hx of fracture of fibula-left proximal fibula 02/2023    4. Osteopenia with high risk of fracture      -We discussed today that patient has a history of a right knee proximal fibula fracture from February 2023.  X-rays show interval healing with abundant callus formation around the fracture site.  - Patient continues to have chronic lateral knee pain.  Her exam shows diffuse tenderness along the medial and lateral joint line as well as over the proximal fibula.  There also is tenderness in the posterior popliteal fossa laterally where there is fullness consistent with a tender popliteal cyst  - At this time we discussed mainstays of treatment for this condition.  Patient should maximize her Tylenol 3000 mg/day.  - Small amount of meloxicam will be prescribed to help with decreasing inflammation.  We discussed patient's allergies including history of ibuprofen potentially causing some throat irritation and swelling but not requiring intubation or any loss of airway.  Patient states her symptoms were reversed with Benadryl.  Patient will be very aware of medication usage as well as how to reverse any negative side effect and will discontinue it if any side effect arises.  - We did discuss indication for intra-articular cortisone injection as well as getting advanced imaging of the knee to evaluate for osteoarthritis of the proximal tibia fibular joint as well as osteoarthritic findings and Baker's cyst not fully visualized on x-ray.  This is being deferred at this time.    -Patient will continue with her exercise routine 3500 steps daily as she tolerates.  - Patient may follow-up as needed.  -Call direct clinic number [[phone]] at any time with questions or concerns in regards to your recent office visit with me.     Bakrai Hanna PA-C  [facility]    This note was completed in part using a voice recognition software, any grammatical or context distortion are unintentional and inherent to the software.         SUBJECTIVE  June Quintero is a/an 74 year old female who is seen in consultation at the request of  Jennifer Hankins M.D. for evaluation of right lower leg pain. The patient is seen by themselves.    Expanded HPI:  Patient states that she fell at home on February 13, 2023 slipping on ice while getting her newspaper.  She had pain and discomfort but was able to get up under her own power and get back to her home.  Subsequently she fell again inside her home the same day due to weakness of the leg.  She subsequently was seen at [facility] and was diagnosed with a right proximal fibular fracture.  She was placed into a cam walker boot.  This was uncomfortable and painful as it applied pressure to the fracture site proximal lateral knee.  She subsequently was seen by an orthopedist and boot was discontinued with the patient allowed to weight-bear as tolerated.  Since then she has continued to complain of chronic knee pain.  In review of Counts include 234 beds at the [facility] records there appears to be interval improvement of patient's condition but patient states that she never actually got better.  X-rays did show interval healing of the proximal fibula with abundant callus formation.  At this time patient complains of intermittent right hemipelvic pain, right knee pain as well as right ankle pain and symptoms can be present at various times but are not always all inclusive and present at the same time.  Appointment today will focus solely on her knee.  - Patient does walk in the mall every morning approximately 3500 steps.  She states that she really has no physical limitations or pain while doing a majority of her walking activity but towards the end of her walk she does have some increased discomfort.  Currently she is utilizing acetaminophen 1000 mg twice  daily dosing in the a.m. and p.m.  Patient occasionally will use a topical arthritis spray but this is rare.  She denies any mechanical symptoms.  She denies any numbness or tingling of the extremity.      Onset: 1 years(s) ago. DOI 2/13/23, slipped and fell at home on ice while getting newspaper. Fell again inside the house later that day. Pt had previous treatment through [facility] for previous fibula fracture. Pt followed up with Ortho on 2/15/23 and 3/22/23 for repeat evaluation and XR with improvement in condition with pain and function with F/U as needed. Today, pt is reporting she never actually improved and has had pain since the injury.  Location of Pain: proximal fibula - also reports pain from the right heel to the hip; occasional tingling in the ankle/foot  Rating of Pain at worst: 10/10  Rating of Pain Currently: 4/10  Worsened by: walking more, difficulty sleeping, down stairs, standing long periods, sitting long periods  Better with: heat 2x alternating with ice 1x,   Treatments tried: ice and heat, Topical spray, walking boot previously (2/2023), Tylenol  Quality: aching, dull  Associated symptoms: tingling and feeling of instability  Orthopedic history: YES - Date: 02/2023 - Right proximal fibula fx, Hx of Osteoporosis  Relevant surgical history: NO  Social history: social history: retired; occasional grocery shopping for neighbor, walking in mall regularly, home activities/chores    Past Medical History:   Diagnosis Date    Acute gastric ulcer 02/19/2013    Problem list name updated by automated process. Provider to review    Closed fracture of proximal fibula 02/2023    Macrocytic anemia 04/22/2023     Social History     Socioeconomic History    Marital status:    Tobacco Use    Smoking status: Never    Smokeless tobacco: Never   Vaping Use    Vaping status: Never Used   Substance and Sexual Activity    Alcohol use: Not Currently     Comment: 1-2 every 3 days    Drug  use: Never    Sexual activity: Not Currently     Comment:  11/2019   Social History Narrative    She is . They do not have children. 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      Social Determinants of Health     Financial Resource Strain: Low Risk  (5/1/2024)    Financial Resource Strain     Within the past 12 months, have you or your family members you live with been unable to get utilities (heat, electricity) when it was really needed?: No   Food Insecurity: Low Risk  (5/1/2024)    Food Insecurity     Within the past 12 months, did you worry that your food would run out before you got money to buy more?: No     Within the past 12 months, did the food you bought just not last and you didn t have money to get more?: No   Transportation Needs: Low Risk  (5/1/2024)    Transportation Needs     Within the past 12 months, has lack of transportation kept you from medical appointments, getting your medicines, non-medical meetings or appointments, work, or from getting things that you need?: No   Physical Activity: Unknown (5/1/2024)    Exercise Vital Sign     Days of Exercise per Week: 3 days   Stress: No Stress Concern Present (5/1/2024)    [employer] - Occupational Stress Questionnaire     Feeling of Stress : Only a little   Social Connections: Unknown (5/1/2024)    Social Connection and Isolation Panel [NHANES]     Frequency of Social Gatherings with Friends and Family: Twice a week   Interpersonal Safety: Low Risk  (5/1/2024)    Interpersonal Safety     Do you feel physically and emotionally safe where you currently live?: Yes     Within the past 12 months, have you been hit, slapped, kicked or otherwise physically hurt by someone?: No     Within the past 12 months, have you been humiliated or emotionally abused in other ways by your partner or ex-partner?: No   Housing Stability: Low Risk  (5/1/2024)     Housing Stability     Do you have housing? : Yes     Are you worried about losing your housing?: No         Patient's past medical, surgical, social, and family histories were personally reviewed today and no changes are noted.    REVIEW OF SYSTEMS:  10 point ROS is negative other than symptoms noted above in HPI, Past Medical History or as stated below  Constitutional: NEGATIVE for fever, chills, change in weight  Skin: NEGATIVE for worrisome rashes, moles or lesions  GI/: NEGATIVE for bowel or bladder changes  Neuro: NEGATIVE for weakness, dizziness or paresthesias    OBJECTIVE:  Vital signs as noted in EPIC for 5/8/2024  General: healthy, alert and in no distress  HEENT: no scleral icterus or conjunctival erythema  Skin: no suspicious lesions or rash. No jaundice.  CV: no pedal edema  Resp: normal respiratory effort without conversational dyspnea   Psych: normal mood and affect  Gait: normal steady gait with appropriate coordination and balance  Neuro: Normal light sensory exam of lower extremity      MSK:  Exam shows a pleasant 74-year-old female who ambulates full weightbearing without assistive device.  She is alert and orientated x 3.  Examination of both lower extremities show no bruising, no swelling and no ecchymosis.  No atrophy.  Examination of the right knee shows no previous surgical incisions.  No significant effusion compared to the contralateral left knee.  She has full knee extension and flexion past 135 degrees without pain or discomfort.  No crepitation.  She shows diffuse tenderness along the medial and lateral joint line to palpation.  There is also tenderness over the proximal fibular neck and fibular head as well as some slight irritation to the distal IT band laterally.  No pain throughout the extensor mechanism of the knee.  She has 5 out of 5 motor tone with flexion and extension of the knee against resistance without pain.  Varus and valgus stressing are within normal limits without  laxity or pain.  Normal anatomical alignment of the knee is noted.  Passive range of motion of the hip is within normal limits with some mild discomfort noted over the right lateral hip but no pain in the groin or the knee.  She is neurovascularly intact L2-S1 bilaterally including the peroneal nerve down to the ankle.  No pain in the gastrocsoleus complex.  She is tender in the posterior popliteal fossa laterally on the right knee with some fullness consistent with a popliteal/Baker's cyst.  No evidence of lower extremity edema which would indicate popliteal cyst rupture.  Circumduction maneuvers of the knee are unremarkable without symptoms being reproduced.  Anterior and posterior drawer show stable endpoints.  No crepitation of the knee.  No excessive warmth of the knee.        Independent visualization of the below image:  Previous x-rays of the patient's right tibia and fibula are personally reviewed showing proximal fibular fracture healed with abundant callus formation.  - Three-view x-ray of the patient's right knee are obtained and reviewed today showing joint spaces well-maintained.  Again proximal fibular fracture healed.  No significant high-grade arthritis noted.  No evidence of loose body.    EXAM: XR TIBIA AND FIBULA RIGHT 2 VIEWS  LOCATION: [facility]  DATE: 5/1/2024     INDICATION: Proximal fibular pain.  COMPARISON: None.                                                                    IMPRESSION:      1.  Cortical thickening in the proximal fibula, favored to be due to ossification along the proximal interosseous membrane, versus a well-healed old nondisplaced fracture. No significant bony deformity or definite proximal femoral fracture.     2.  No evidence of acute fracture in the fibula or tibia.     3.  Normal knee and ankle joint alignment and spacing.     4.  Moderate generalized atrophy throughout the right knee and calf.     5.  Severe arterial calcifications in the  right lower leg.    Patient's conditions were thoroughly discussed during today's visit with total time reviewing patient's previous medical records/history/radiology, face-to-face examination and discussion and plan of care with the patient and documentation being 30 minutes.    Bakari Hanna PA-C  [facility]    This note was completed in part using a voice recognition software, any grammatical or context distortion are unintentional and inherent to the software.

## 2024-05-09 RX ORDER — ALENDRONATE SODIUM 70 MG/1
TABLET ORAL
Qty: 12 TABLET | Refills: 3 | Status: SHIPPED | OUTPATIENT
Start: 2024-05-09

## 2024-05-14 ENCOUNTER — ANCILLARY PROCEDURE (OUTPATIENT)
Dept: GENERAL RADIOLOGY | Facility: CLINIC | Age: 74
End: 2024-05-14
Attending: PHYSICIAN ASSISTANT
Payer: COMMERCIAL

## 2024-05-14 ENCOUNTER — OFFICE VISIT (OUTPATIENT)
Dept: ORTHOPEDICS | Facility: CLINIC | Age: 74
End: 2024-05-14
Attending: FAMILY MEDICINE
Payer: COMMERCIAL

## 2024-05-14 VITALS — BODY MASS INDEX: 27.29 KG/M2 | HEIGHT: 58 IN | WEIGHT: 130 LBS

## 2024-05-14 DIAGNOSIS — I10 ESSENTIAL HYPERTENSION WITH GOAL BLOOD PRESSURE LESS THAN 140/90: ICD-10-CM

## 2024-05-14 DIAGNOSIS — M85.80 OSTEOPENIA WITH HIGH RISK OF FRACTURE: ICD-10-CM

## 2024-05-14 DIAGNOSIS — M71.21 BAKER CYST, RIGHT: ICD-10-CM

## 2024-05-14 DIAGNOSIS — Z87.81 HX OF FRACTURE OF FIBULA: ICD-10-CM

## 2024-05-14 DIAGNOSIS — M25.561 CHRONIC PAIN OF RIGHT KNEE: Primary | ICD-10-CM

## 2024-05-14 DIAGNOSIS — G89.29 CHRONIC PAIN OF RIGHT KNEE: Primary | ICD-10-CM

## 2024-05-14 DIAGNOSIS — M79.661 PAIN OF RIGHT LOWER LEG: ICD-10-CM

## 2024-05-14 DIAGNOSIS — I10 ESSENTIAL HYPERTENSION, BENIGN: ICD-10-CM

## 2024-05-14 PROCEDURE — 73562 X-RAY EXAM OF KNEE 3: CPT | Mod: TC | Performed by: RADIOLOGY

## 2024-05-14 PROCEDURE — 99213 OFFICE O/P EST LOW 20 MIN: CPT | Performed by: PHYSICIAN ASSISTANT

## 2024-05-14 RX ORDER — MELOXICAM 7.5 MG/1
7.5 TABLET ORAL DAILY
Qty: 14 TABLET | Refills: 0 | Status: SHIPPED | OUTPATIENT
Start: 2024-05-14

## 2024-05-14 NOTE — LETTER
5/14/2024         RE: June Quintero  [address]  [place] [address]        Dear Colleague,    Thank you for referring your patient, June Quintero, to the [facility]. Please see a copy of my visit note below.    ASSESSMENT & PLAN       Today we discussed the underlying etiology/pathology of patient's   1. Chronic pain of right knee    2. Baker cyst, right knee    3. Hx of fracture of fibula-left proximal fibula 02/2023    4. Osteopenia with high risk of fracture      -We discussed today that patient has a history of a right knee proximal fibula fracture from February 2023.  X-rays show interval healing with abundant callus formation around the fracture site.  - Patient continues to have chronic lateral knee pain.  Her exam shows diffuse tenderness along the medial and lateral joint line as well as over the proximal fibula.  There also is tenderness in the posterior popliteal fossa laterally where there is fullness consistent with a tender popliteal cyst  - At this time we discussed mainstays of treatment for this condition.  Patient should maximize her Tylenol 3000 mg/day.  - Small amount of meloxicam will be prescribed to help with decreasing inflammation.  We discussed patient's allergies including history of ibuprofen potentially causing some throat irritation and swelling but not requiring intubation or any loss of airway.  Patient states her symptoms were reversed with Benadryl.  Patient will be very aware of medication usage as well as how to reverse any negative side effect and will discontinue it if any side effect arises.  - We did discuss indication for intra-articular cortisone injection as well as getting advanced imaging of the knee to evaluate for osteoarthritis of the proximal tibia fibular joint as well as osteoarthritic findings and Baker's cyst not fully visualized on x-ray.  This is being deferred at this time.     -Patient will continue with her exercise routine 3500 steps daily as she tolerates.  - Patient may follow-up as needed.  -Call direct clinic number [[phone]] at any time with questions or concerns in regards to your recent office visit with me.     Bakari Hanna PA-C  [facility]    This note was completed in part using a voice recognition software, any grammatical or context distortion are unintentional and inherent to the software.         SUBJECTIVE  June Quintero is a/an 74 year old female who is seen in consultation at the request of  Jennifer Hankins M.D. for evaluation of right lower leg pain. The patient is seen by themselves.    Expanded HPI:  Patient states that she fell at home on February 13, 2023 slipping on ice while getting her newspaper.  She had pain and discomfort but was able to get up under her own power and get back to her home.  Subsequently she fell again inside her home the same day due to weakness of the leg.  She subsequently was seen at [facility] and was diagnosed with a right proximal fibular fracture.  She was placed into a cam walker boot.  This was uncomfortable and painful as it applied pressure to the fracture site proximal lateral knee.  She subsequently was seen by an orthopedist and boot was discontinued with the patient allowed to weight-bear as tolerated.  Since then she has continued to complain of chronic knee pain.  In review of [facility] records there appears to be interval improvement of patient's condition but patient states that she never actually got better.  X-rays did show interval healing of the proximal fibula with abundant callus formation.  At this time patient complains of intermittent right hemipelvic pain, right knee pain as well as right ankle pain and symptoms can be present at various times but are not always all inclusive and present at the same time.  Appointment today will focus solely on her knee.  - Patient does  walk in the mall every morning approximately 3500 steps.  She states that she really has no physical limitations or pain while doing a majority of her walking activity but towards the end of her walk she does have some increased discomfort.  Currently she is utilizing acetaminophen 1000 mg twice daily dosing in the a.m. and p.m.  Patient occasionally will use a topical arthritis spray but this is rare.  She denies any mechanical symptoms.  She denies any numbness or tingling of the extremity.      Onset: 1 years(s) ago. DOI 2/13/23, slipped and fell at home on ice while getting newspaper. Fell again inside the house later that day. Pt had previous treatment through [facility] for previous fibula fracture. Pt followed up with Ortho on 2/15/23 and 3/22/23 for repeat evaluation and XR with improvement in condition with pain and function with F/U as needed. Today, pt is reporting she never actually improved and has had pain since the injury.  Location of Pain: proximal fibula - also reports pain from the right heel to the hip; occasional tingling in the ankle/foot  Rating of Pain at worst: 10/10  Rating of Pain Currently: 4/10  Worsened by: walking more, difficulty sleeping, down stairs, standing long periods, sitting long periods  Better with: heat 2x alternating with ice 1x,   Treatments tried: ice and heat, Topical spray, walking boot previously (2/2023), Tylenol  Quality: aching, dull  Associated symptoms: tingling and feeling of instability  Orthopedic history: YES - Date: 02/2023 - Right proximal fibula fx, Hx of Osteoporosis  Relevant surgical history: NO  Social history: social history: retired; occasional grocery shopping for neighbor, walking in mall regularly, home activities/chores    Past Medical History:   Diagnosis Date     Acute gastric ulcer 02/19/2013    Problem list name updated by automated process. Provider to review     Closed fracture of proximal fibula 02/2023     Macrocytic  anemia 04/22/2023     Social History     Socioeconomic History     Marital status:    Tobacco Use     Smoking status: Never     Smokeless tobacco: Never   Vaping Use     Vaping status: Never Used   Substance and Sexual Activity     Alcohol use: Not Currently     Comment: 1-2 every 3 days     Drug use: Never     Sexual activity: Not Currently     Comment:  11/2019   Social History Narrative    She is . They do not have children. 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      Social Determinants of Health     Financial Resource Strain: Low Risk  (5/1/2024)    Financial Resource Strain      Within the past 12 months, have you or your family members you live with been unable to get utilities (heat, electricity) when it was really needed?: No   Food Insecurity: Low Risk  (5/1/2024)    Food Insecurity      Within the past 12 months, did you worry that your food would run out before you got money to buy more?: No      Within the past 12 months, did the food you bought just not last and you didn t have money to get more?: No   Transportation Needs: Low Risk  (5/1/2024)    Transportation Needs      Within the past 12 months, has lack of transportation kept you from medical appointments, getting your medicines, non-medical meetings or appointments, work, or from getting things that you need?: No   Physical Activity: Unknown (5/1/2024)    Exercise Vital Sign      Days of Exercise per Week: 3 days   Stress: No Stress Concern Present (5/1/2024)    [employer] [place] [employer] - Occupational Stress Questionnaire      Feeling of Stress : Only a little   Social Connections: Unknown (5/1/2024)    Social Connection and Isolation Panel [NHANES]      Frequency of Social Gatherings with Friends and Family: Twice a week   Interpersonal Safety: Low Risk  (5/1/2024)    Interpersonal Safety      Do you feel physically and emotionally safe  where you currently live?: Yes      Within the past 12 months, have you been hit, slapped, kicked or otherwise physically hurt by someone?: No      Within the past 12 months, have you been humiliated or emotionally abused in other ways by your partner or ex-partner?: No   Housing Stability: Low Risk  (5/1/2024)    Housing Stability      Do you have housing? : Yes      Are you worried about losing your housing?: No         Patient's past medical, surgical, social, and family histories were personally reviewed today and no changes are noted.    REVIEW OF SYSTEMS:  10 point ROS is negative other than symptoms noted above in HPI, Past Medical History or as stated below  Constitutional: NEGATIVE for fever, chills, change in weight  Skin: NEGATIVE for worrisome rashes, moles or lesions  GI/: NEGATIVE for bowel or bladder changes  Neuro: NEGATIVE for weakness, dizziness or paresthesias    OBJECTIVE:  Vital signs as noted in EPIC for 5/8/2024  General: healthy, alert and in no distress  HEENT: no scleral icterus or conjunctival erythema  Skin: no suspicious lesions or rash. No jaundice.  CV: no pedal edema  Resp: normal respiratory effort without conversational dyspnea   Psych: normal mood and affect  Gait: normal steady gait with appropriate coordination and balance  Neuro: Normal light sensory exam of lower extremity      MSK:  Exam shows a pleasant 74-year-old female who ambulates full weightbearing without assistive device.  She is alert and orientated x 3.  Examination of both lower extremities show no bruising, no swelling and no ecchymosis.  No atrophy.  Examination of the right knee shows no previous surgical incisions.  No significant effusion compared to the contralateral left knee.  She has full knee extension and flexion past 135 degrees without pain or discomfort.  No crepitation.  She shows diffuse tenderness along the medial and lateral joint line to palpation.  There is also tenderness over the proximal  fibular neck and fibular head as well as some slight irritation to the distal IT band laterally.  No pain throughout the extensor mechanism of the knee.  She has 5 out of 5 motor tone with flexion and extension of the knee against resistance without pain.  Varus and valgus stressing are within normal limits without laxity or pain.  Normal anatomical alignment of the knee is noted.  Passive range of motion of the hip is within normal limits with some mild discomfort noted over the right lateral hip but no pain in the groin or the knee.  She is neurovascularly intact L2-S1 bilaterally including the peroneal nerve down to the ankle.  No pain in the gastrocsoleus complex.  She is tender in the posterior popliteal fossa laterally on the right knee with some fullness consistent with a popliteal/Baker's cyst.  No evidence of lower extremity edema which would indicate popliteal cyst rupture.  Circumduction maneuvers of the knee are unremarkable without symptoms being reproduced.  Anterior and posterior drawer show stable endpoints.  No crepitation of the knee.  No excessive warmth of the knee.        Independent visualization of the below image:  Previous x-rays of the patient's right tibia and fibula are personally reviewed showing proximal fibular fracture healed with abundant callus formation.  - Three-view x-ray of the patient's right knee are obtained and reviewed today showing joint spaces well-maintained.  Again proximal fibular fracture healed.  No significant high-grade arthritis noted.  No evidence of loose body.    EXAM: XR TIBIA AND FIBULA RIGHT 2 VIEWS  LOCATION: [facility]  DATE: 5/1/2024     INDICATION: Proximal fibular pain.  COMPARISON: None.                                                                    IMPRESSION:      1.  Cortical thickening in the proximal fibula, favored to be due to ossification along the proximal interosseous membrane, versus a well-healed old nondisplaced  fracture. No significant bony deformity or definite proximal femoral fracture.     2.  No evidence of acute fracture in the fibula or tibia.     3.  Normal knee and ankle joint alignment and spacing.     4.  Moderate generalized atrophy throughout the right knee and calf.     5.  Severe arterial calcifications in the right lower leg.    Patient's conditions were thoroughly discussed during today's visit with total time reviewing patient's previous medical records/history/radiology, face-to-face examination and discussion and plan of care with the patient and documentation being 30 minutes.    Bakari Hanna PA-C  [facility]    This note was completed in part using a voice recognition software, any grammatical or context distortion are unintentional and inherent to the software.       Again, thank you for allowing me to participate in the care of your patient.        Sincerely,        Bakari Hanna PA-C

## 2024-05-14 NOTE — PATIENT INSTRUCTIONS
Today we discussed the underlying etiology/pathology of patient's   1. Chronic pain of right knee    2. Baker cyst, right knee    3. Hx of fracture of fibula-left proximal fibula 02/2023    4. Osteopenia with high risk of fracture      -We discussed today that patient has a history of a right knee proximal fibula fracture from February 2023.  X-rays show interval healing with abundant callus formation around the fracture site.  - Patient continues to have chronic lateral knee pain.  Her exam shows diffuse tenderness along the medial and lateral joint line as well as over the proximal fibula.  There also is tenderness in the posterior popliteal fossa laterally where there is fullness consistent with a tender popliteal cyst  - At this time we discussed mainstays of treatment for this condition.  Patient should maximize her Tylenol 3000 mg/day.  - Small amount of meloxicam will be prescribed to help with decreasing inflammation.  We discussed patient's allergies including history of ibuprofen potentially causing some throat irritation and swelling but not requiring intubation or any loss of airway.  Patient states her symptoms were reversed with Benadryl.  Patient will be very aware of medication usage as well as how to reverse any negative side effect and will discontinue it if any side effect arises.  - We did discuss indication for intra-articular cortisone injection as well as getting advanced imaging of the knee to evaluate for osteoarthritis of the proximal tibia fibular joint as well as osteoarthritic findings and Baker's cyst not fully visualized on x-ray.  This is being deferred at this time.    -Patient will continue with her exercise routine 3500 steps daily as she tolerates.  - Patient may follow-up as needed.  -Call direct clinic number [[phone]] at any time with questions or concerns in regards to your recent office visit with me.     Bakari Hanna PA-C  [facility]    This note was completed in part using a voice recognition software, any grammatical or context distortion are unintentional and inherent to the software.

## 2024-05-15 RX ORDER — METOPROLOL SUCCINATE 50 MG/1
TABLET, EXTENDED RELEASE ORAL
Qty: 180 TABLET | Refills: 2 | Status: SHIPPED | OUTPATIENT
Start: 2024-05-15

## 2024-05-15 RX ORDER — LOSARTAN POTASSIUM 25 MG/1
25 TABLET ORAL DAILY
Qty: 90 TABLET | Refills: 2 | Status: SHIPPED | OUTPATIENT
Start: 2024-05-15

## 2024-05-16 ENCOUNTER — TELEPHONE (OUTPATIENT)
Dept: ORTHOPEDICS | Facility: CLINIC | Age: 74
End: 2024-05-16
Payer: COMMERCIAL

## 2024-05-16 NOTE — CONFIDENTIAL NOTE
I did speak with June today to follow-up on how she is doing with her knee and fibular pain.  I also wanted to ensure that she was tolerating meloxicam with her previous allergy history.  She states that this time she did  the meloxicam but has not utilized it.  She wanted to try to utilize Tylenol 3 times a day and eat fruits and vegetables high in antioxidants first.  She has added in her afternoon dose of Tylenol and states that her pain control is improved.  We discussed that if she does choose to use meloxicam the due to her allergy history she should take that medication in the presence of other individuals and have Benadryl available that if she starts to get a side effect that others can help her and administer medicine if needed.  Patient otherwise will follow-up as needed.

## 2024-06-05 ENCOUNTER — ANCILLARY PROCEDURE (OUTPATIENT)
Dept: MAMMOGRAPHY | Facility: CLINIC | Age: 74
End: 2024-06-05
Attending: FAMILY MEDICINE
Payer: COMMERCIAL

## 2024-06-05 PROCEDURE — 77063 BREAST TOMOSYNTHESIS BI: CPT

## 2024-06-14 DIAGNOSIS — E78.00 HYPERCHOLESTEREMIA: ICD-10-CM

## 2024-06-17 RX ORDER — SIMVASTATIN 10 MG
10 TABLET ORAL AT BEDTIME
Qty: 90 TABLET | Refills: 3 | Status: SHIPPED | OUTPATIENT
Start: 2024-06-17

## 2024-07-06 DIAGNOSIS — Z78.0 POSTMENOPAUSAL STATUS: ICD-10-CM

## 2024-07-06 DIAGNOSIS — M85.80 OSTEOPENIA WITH HIGH RISK OF FRACTURE: Primary | ICD-10-CM

## 2024-07-16 ENCOUNTER — TELEPHONE (OUTPATIENT)
Dept: FAMILY MEDICINE | Facility: CLINIC | Age: 74
End: 2024-07-16
Payer: COMMERCIAL

## 2024-07-16 NOTE — TELEPHONE ENCOUNTER
General Call    Contacts       Contact Date/Time Type Contact Phone/Fax    07/16/2024 12:23 PM CDT Phone (Incoming) June Quintero (Self) [phone] (H)          Reason for Call:  Dexa/bone density    What are your questions or concerns:  patient calling regarding scheduling a DEXA/bone density scan. Farhad confirmed with imaging department that patient cannot have imaging covered by insurance at this time as it has not been two years since her last DEXA. If ordering provider is wanting patient to have this imaging done for a more specific reason, insurance may need to be contacted to see what reasoning for additional/early DEXA to be done that would be covered by insurance.    Please advise and call patient to update.      Okay to leave a detailed message?: Yes at Home number on file [phone] (home)

## 2024-10-24 DIAGNOSIS — I10 ESSENTIAL HYPERTENSION WITH GOAL BLOOD PRESSURE LESS THAN 140/90: ICD-10-CM

## 2024-10-24 DIAGNOSIS — I10 ESSENTIAL HYPERTENSION, BENIGN: ICD-10-CM

## 2024-10-25 RX ORDER — LOSARTAN POTASSIUM 25 MG/1
25 TABLET ORAL DAILY
Qty: 90 TABLET | Refills: 1 | Status: SHIPPED | OUTPATIENT
Start: 2024-10-25

## 2024-10-25 RX ORDER — METOPROLOL SUCCINATE 50 MG/1
TABLET, EXTENDED RELEASE ORAL
Qty: 180 TABLET | Refills: 1 | Status: SHIPPED | OUTPATIENT
Start: 2024-10-25

## 2024-10-28 ENCOUNTER — TRANSFERRED RECORDS (OUTPATIENT)
Dept: HEALTH INFORMATION MANAGEMENT | Facility: CLINIC | Age: 74
End: 2024-10-28
Payer: COMMERCIAL

## 2025-02-12 ENCOUNTER — TELEPHONE (OUTPATIENT)
Dept: FAMILY MEDICINE | Facility: CLINIC | Age: 75
End: 2025-02-12
Payer: COMMERCIAL

## 2025-02-12 NOTE — TELEPHONE ENCOUNTER
Reason for call:  Other   Patient called regarding (reason for call): call back  Additional comments: FABRIZIOI---    PVD test was done on patient  Shows she has mild changes to bilateral legs  She is asymptotic    Phone number to reach [facility] [phone]      Best Time:  any    Can we leave a detailed message on this number?  YES    Travel screening: Not Applicable

## 2025-02-17 NOTE — TELEPHONE ENCOUNTER
In this case, the recommendation would be to optimally control her blood pressure, cholesterol and blood sugar (risk factors for poor circulation).  I don't see the PVD test results in her chart, but can discuss these risk factors any time at a visit if she would like.

## 2025-02-18 NOTE — TELEPHONE ENCOUNTER
Called patient and left message to call back.    Please review note from Dr. Hankins below regarding the conversation on peripheral vascular disease.  If patient would like a visit with PCP I would just used next available versus reserved spot as this is not urgent.    Davie Aquino RN     [facility]

## 2025-02-18 NOTE — TELEPHONE ENCOUNTER
"Relayed PCP advisement to patient.  Patient states she did request records be sent to clinic.  Patient states \" I will call back and schedule ab appointment to discuss later\".   "

## 2025-02-25 DIAGNOSIS — E78.00 HYPERCHOLESTEREMIA: ICD-10-CM

## 2025-02-26 RX ORDER — SIMVASTATIN 10 MG
10 TABLET ORAL AT BEDTIME
Qty: 100 TABLET | Refills: 2 | OUTPATIENT
Start: 2025-02-26

## 2025-02-28 DIAGNOSIS — Z91.89 HIGH RISK FOR HIP FRACTURE: ICD-10-CM

## 2025-02-28 DIAGNOSIS — M85.80 OSTEOPENIA, UNSPECIFIED LOCATION: ICD-10-CM

## 2025-03-03 RX ORDER — ALENDRONATE SODIUM 70 MG/1
TABLET ORAL
Qty: 12 TABLET | Refills: 0 | Status: SHIPPED | OUTPATIENT
Start: 2025-03-03

## 2025-03-19 DIAGNOSIS — I10 ESSENTIAL HYPERTENSION WITH GOAL BLOOD PRESSURE LESS THAN 140/90: ICD-10-CM

## 2025-03-19 DIAGNOSIS — I10 ESSENTIAL HYPERTENSION, BENIGN: ICD-10-CM

## 2025-03-20 RX ORDER — LOSARTAN POTASSIUM 25 MG/1
25 TABLET ORAL DAILY
Qty: 80 TABLET | Refills: 3 | OUTPATIENT
Start: 2025-03-20

## 2025-03-20 RX ORDER — METOPROLOL SUCCINATE 50 MG/1
50 TABLET, EXTENDED RELEASE ORAL
Qty: 160 TABLET | Refills: 3 | OUTPATIENT
Start: 2025-03-20

## 2025-04-01 ENCOUNTER — PATIENT OUTREACH (OUTPATIENT)
Dept: CARE COORDINATION | Facility: CLINIC | Age: 75
End: 2025-04-01
Payer: COMMERCIAL

## 2025-04-15 ENCOUNTER — PATIENT OUTREACH (OUTPATIENT)
Dept: CARE COORDINATION | Facility: CLINIC | Age: 75
End: 2025-04-15
Payer: COMMERCIAL

## 2025-05-01 ENCOUNTER — OFFICE VISIT (OUTPATIENT)
Dept: URGENT CARE | Facility: URGENT CARE | Age: 75
End: 2025-05-01
Payer: COMMERCIAL

## 2025-05-01 VITALS
SYSTOLIC BLOOD PRESSURE: 183 MMHG | TEMPERATURE: 98 F | DIASTOLIC BLOOD PRESSURE: 74 MMHG | RESPIRATION RATE: 20 BRPM | OXYGEN SATURATION: 98 % | HEART RATE: 78 BPM

## 2025-05-01 DIAGNOSIS — I10 ESSENTIAL HYPERTENSION WITH GOAL BLOOD PRESSURE LESS THAN 140/90: ICD-10-CM

## 2025-05-01 DIAGNOSIS — L08.9 TOE INFECTION: Primary | ICD-10-CM

## 2025-05-01 RX ORDER — DOXYCYCLINE 100 MG/1
100 CAPSULE ORAL 2 TIMES DAILY
Qty: 14 CAPSULE | Refills: 0 | Status: SHIPPED | OUTPATIENT
Start: 2025-05-01 | End: 2025-05-08

## 2025-05-01 RX ORDER — LOSARTAN POTASSIUM 25 MG/1
25 TABLET ORAL DAILY
Qty: 100 TABLET | Refills: 0 | Status: SHIPPED | OUTPATIENT
Start: 2025-05-01

## 2025-05-01 NOTE — TELEPHONE ENCOUNTER
Patient has [facility] coverage and is eligible to get certain prescriptions as a 100-day supply.      Prescriptions updated to 100-day supply: losartan     Lora Espinoza PharmD, [facility]  Retail Pharmacy Specialist  [phone]

## 2025-05-01 NOTE — PATIENT INSTRUCTIONS
I have sent in a prescription for an antibiotic to treat infection, stop taking multivitamin and vitamin D while taking antibiotic as it can decrease effectiveness of medication, but may resume after antibiotic treatment. I would also recommend using a toe spacer between your toes during the day to prevent friction and assist in the healing process. If symptoms do not improve with toe spacers and antibiotic, follow-up with podiatry for further management.

## 2025-05-01 NOTE — PROGRESS NOTES
Patient presents with:  Musculoskeletal Problem: L foot pain   Pain when she walks and puts weight on it   No injury       Clinical Decision Making:      ICD-10-CM    1. Toe infection  L08.9 doxycycline hyclate (VIBRAMYCIN) 100 MG capsule        Patient with infection to her left little toe, appears to be infected on medial aspect of toe between her 4th and 5th toes as she has a scab there with surrounding erythema and swelling.  No injury to nail. Started on doxycycline for treatment of infection.  We also discussed using toe spacers as the friction from her fourth toe is likely the reason the toe wound is not healing.  Bandage with Neosporin placed on toe today in clinic with gauze between 4th and 5th toe for spacer.  If symptoms do not improve with antibiotic treatment and toe spacers recommend following up with podiatrist, offered to place referral though patient states she already has a podiatrist that she sees and will reach out if she is not having improvement in symptoms. Patient instructions as below. Discussed red flag symptoms for when to return.       Patient Instructions   I have sent in a prescription for an antibiotic to treat infection, stop taking multivitamin and vitamin D while taking antibiotic as it can decrease effectiveness of medication, but may resume after antibiotic treatment. I would also recommend using a toe spacer between your toes during the day to prevent friction and assist in the healing process. If symptoms do not improve with toe spacers and antibiotic, follow-up with podiatry for further management.     HPI:  June Quintero is a 75 year old female who presents today with concerns of left pinky toe pain. Symptoms started 4 days ago. Caused from a sore on her toe that won't heal. No known injury. No fevers. Has tried neosporin without improvement.     History obtained from the patient.    Problem List:  2024-05: Pain of right lower leg  2023-07: High risk for hip  fracture  2023-04: Macrocytic anemia  2023-04: Impaired fasting glucose  2023-04: Postmenopausal status  2021-03: Leg length discrepancy  2021-03: Alcohol use disorder, mild, abuse  2018-10: Adjustment disorder with anxious mood  2013-02: Acute gastric ulcer  2013-02: Allergic rhinitis  2013-02: Essential hypertension, benign  2013-02: Hyperlipidemia  2013-02: Osteopenia with high risk of fracture  2013-02: Health Care Home      Past Medical History:   Diagnosis Date    Acute gastric ulcer 02/19/2013    Problem list name updated by automated process. Provider to review    Closed fracture of proximal fibula 02/2023    Macrocytic anemia 04/22/2023       Social History     Tobacco Use    Smoking status: Never    Smokeless tobacco: Never   Substance Use Topics    Alcohol use: Not Currently     Comment: 1-2 every 3 days       ROS is negative other than what is noted in HPI.       Vitals:    05/01/25 1118   BP: (!) 183/74   Pulse: 78   Resp: 20   Temp: 98  F (36.7  C)   SpO2: 98%       Physical Exam  Constitutional:       General: She is not in acute distress.     Appearance: She is not diaphoretic.   HENT:      Head: Normocephalic and atraumatic.      Right Ear: External ear normal.      Left Ear: External ear normal.   Eyes:      Conjunctiva/sclera: Conjunctivae normal.   Cardiovascular:      Rate and Rhythm: Normal rate and regular rhythm.      Pulses: Normal pulses.   Pulmonary:      Effort: Pulmonary effort is normal. No respiratory distress.   Musculoskeletal:         General: Swelling and tenderness present. Normal range of motion.   Skin:     General: Skin is warm.      Findings: Erythema present.      Comments: Medial aspect of left little toe with pinpoint scab and surrounding erythema, swelling, and tenderness. No active drainage.    Neurological:      General: No focal deficit present.      Mental Status: She is alert.      Sensory: No sensory deficit.   Psychiatric:         Mood and Affect: Mood normal.          Thought Content: Thought content normal.         Judgment: Judgment normal.           At the end of the encounter, I discussed results, diagnosis, medications. Discussed red flags for immediate return to clinic/ER, as well as indications for follow up if no improvement. Patient understood and agreed to plan. Patient was stable for discharge.    MARK Hollis [facility]

## 2025-05-06 ENCOUNTER — NURSE TRIAGE (OUTPATIENT)
Dept: FAMILY MEDICINE | Facility: CLINIC | Age: 75
End: 2025-05-06
Payer: COMMERCIAL

## 2025-05-06 NOTE — TELEPHONE ENCOUNTER
Notified patient of Dr. Hankins's response. Patient agreeable to contact her Podiatry Office (outside of [facility]) and try to get in urgently.      Patient confirms taking doxycycline on a full stomach.

## 2025-05-06 NOTE — TELEPHONE ENCOUNTER
She would need to be seen to assess this further.  Can she or we call podiatry to see if they would see her more urgently?  She should also take the doxycycline on a full stomach.  Eat first, then take.

## 2025-05-06 NOTE — TELEPHONE ENCOUNTER
Nurse Triage SBAR    Is this a 2nd Level Triage? YES, LICENSED PRACTITIONER REVIEW IS REQUIRED    Situation: Blister on left pinky toe    Background: Blister started about two weeks ago. She is unsure if it was caused from podiatry cutting her toes too short.    Assessment: States that the blister starter off red but is now white and smaller than a dime. She was seen in ED last Thursday and prescribed doxycycline but is not tolerating taking the medication. States that it is making her very sleepy and causing diarrhea on occasion. She is only taking one doxycycline a day instead of two because that is all she can tolerate. She states she has no pain when she's sitting still but 10/10 when she is walking.    Protocol Recommended Disposition:   No disposition on file.    Recommendation: No clear disposition. Patient already seen in ED and prescribed antibiotics. Pt is wondering if she should have antibiotics switched to something different?    Routed to provider    Does the patient meet one of the following criteria for ADS visit consideration? 16+ years old, with an [facility] PCP     TIP  Providers, please consider if this condition is appropriate for management at one of our Acute and Diagnostic Services sites.     If patient is a good candidate, please use dotphrase <dot>triageresponse and select Refer to ADS to document.     Additional Information   Negative: Sounds like a life-threatening emergency to the triager   Negative: Followed a burn   Negative: Followed an injury to the skin (such as a cut or scrape)   Negative: Boil (abscess) suspected (painful red lump)   Negative: Widespread rash or redness   Negative: Cold sore suspected (i.e., fever blister sore on the outer lip)   Negative: Insect bite(s) suspected   Negative: Poison ivy, oak, or sumac rash suspected (e.g., itchy rash after contact with poison ivy)   Negative: Sore(s) on female genital area  (e.g., labia, vagina, vulva)   Negative: Sore(s) on male  "genital area (e.g., penis, scrotum)   Negative: Wound infection suspected in surgical incision or traumatic wound   Negative: Black (necrotic), dark purple, or blisters develop in area of sore   Negative: Patient sounds very sick or weak to the triager   Negative: Looks infected (e.g., spreading redness, pus) and fever   Negative: Looks infected and large red area (> 2 inches or 5 cm) or streak   Negative: Ulcer with black scab that is spreading, painless and cause unknown   Negative: Looks infected (e.g., spreading redness, pus) and no fever   Negative: Unexplained sores and 3 or more   Negative: Large sore (> 1 inch or 2.5 cm across)   Negative: Looks like a boil, deep ulcer or is very painful   Negative: Multiple small blisters grouped together in one area of body (i.e., dermatomal distribution or 'band' or 'stripe') and painful   Negative: Sore on foot (e.g., ulcer, wound, blister, red area) AND new-onset or getting worse AND has diabetes   Negative: 1 or 2 small sores   Negative: Sores and crusts are around openings to nose, or anywhere else on face   Negative: Any other family members with similar sores   Negative: New pressure ulcer suspected   Negative: Using antibiotic ointment > 24 hours and new sore occurs   Negative: Using antibiotic ointment > 48 hours and sore increases in size   Negative: Using antibiotic ointment > 1 week and sore not completely healed   Negative: Lower leg sore and venous ulcer suspected (e.g., brownish pigment around sore, leg edema, varicose veins)   Negative: Patient wants to be seen    Answer Assessment - Initial Assessment Questions  1. APPEARANCE of SORES: \"What do the sores look like?\"      White blister and pink around edges (blister), was previously red  2. NUMBER: \"How many sores are there?\"      One  3. SIZE: \"How big is the largest sore?\"      Smaller than a dime  4. LOCATION: \"Where are the sores located?\"      Left pinky toe  5. ONSET: \"When did the sores begin?\"      " "Two weeks ago  6. TENDER: \"Does it hurt when you touch it?\"  (Scale 1-10; or mild, moderate, severe)       Yes, 0/10 when sitting still, 10/10 when walking  7. CAUSE: \"What do you think is causing the sores?\"      Podiatry possibly cut toe nails to short but unsure  8. OTHER SYMPTOMS: \"Do you have any other symptoms?\" (e.g., fever, new weakness)      Denies    Protocols used: Sores-A-[place]    "

## 2025-05-18 DIAGNOSIS — Z91.89 HIGH RISK FOR HIP FRACTURE: ICD-10-CM

## 2025-05-18 DIAGNOSIS — M85.80 OSTEOPENIA, UNSPECIFIED LOCATION: ICD-10-CM

## 2025-05-19 RX ORDER — ALENDRONATE SODIUM 70 MG/1
TABLET ORAL
Qty: 12 TABLET | Refills: 0 | Status: SHIPPED | OUTPATIENT
Start: 2025-05-19

## 2025-06-09 ENCOUNTER — HOSPITAL ENCOUNTER (OUTPATIENT)
Dept: BONE DENSITY | Facility: HOSPITAL | Age: 75
Discharge: HOME | End: 2025-06-09
Attending: FAMILY MEDICINE
Payer: COMMERCIAL

## 2025-06-09 ENCOUNTER — ANCILLARY PROCEDURE (OUTPATIENT)
Dept: MAMMOGRAPHY | Facility: HOSPITAL | Age: 75
End: 2025-06-09
Attending: FAMILY MEDICINE
Payer: COMMERCIAL

## 2025-06-09 DIAGNOSIS — Z12.31 VISIT FOR SCREENING MAMMOGRAM: ICD-10-CM

## 2025-06-09 DIAGNOSIS — M85.80 OSTEOPENIA WITH HIGH RISK OF FRACTURE: ICD-10-CM

## 2025-06-09 DIAGNOSIS — Z78.0 POSTMENOPAUSAL STATUS: ICD-10-CM

## 2025-06-09 PROCEDURE — 77063 BREAST TOMOSYNTHESIS BI: CPT

## 2025-06-09 PROCEDURE — 77080 DXA BONE DENSITY AXIAL: CPT

## 2025-06-12 ENCOUNTER — RESULTS FOLLOW-UP (OUTPATIENT)
Dept: FAMILY MEDICINE | Facility: CLINIC | Age: 75
End: 2025-06-12
Payer: COMMERCIAL

## 2025-07-02 ENCOUNTER — ANCILLARY PROCEDURE (OUTPATIENT)
Dept: GENERAL RADIOLOGY | Facility: CLINIC | Age: 75
End: 2025-07-02
Attending: FAMILY MEDICINE
Payer: COMMERCIAL

## 2025-07-02 ENCOUNTER — OFFICE VISIT (OUTPATIENT)
Dept: FAMILY MEDICINE | Facility: CLINIC | Age: 75
End: 2025-07-02
Attending: FAMILY MEDICINE
Payer: COMMERCIAL

## 2025-07-02 VITALS
BODY MASS INDEX: 28.22 KG/M2 | SYSTOLIC BLOOD PRESSURE: 184 MMHG | TEMPERATURE: 97.6 F | HEIGHT: 57 IN | DIASTOLIC BLOOD PRESSURE: 88 MMHG | OXYGEN SATURATION: 98 % | HEART RATE: 70 BPM | RESPIRATION RATE: 18 BRPM | WEIGHT: 130.8 LBS

## 2025-07-02 DIAGNOSIS — M85.80 OSTEOPENIA WITH HIGH RISK OF FRACTURE: ICD-10-CM

## 2025-07-02 DIAGNOSIS — M25.551 HIP PAIN, RIGHT: ICD-10-CM

## 2025-07-02 DIAGNOSIS — I10 ESSENTIAL HYPERTENSION, BENIGN: ICD-10-CM

## 2025-07-02 DIAGNOSIS — M79.661 PAIN OF RIGHT LOWER LEG: ICD-10-CM

## 2025-07-02 DIAGNOSIS — Z13.1 SCREENING FOR DIABETES MELLITUS: ICD-10-CM

## 2025-07-02 DIAGNOSIS — Z00.00 ENCOUNTER FOR MEDICARE ANNUAL WELLNESS EXAM: Primary | ICD-10-CM

## 2025-07-02 DIAGNOSIS — E78.2 MIXED HYPERLIPIDEMIA: ICD-10-CM

## 2025-07-02 LAB
ERYTHROCYTE [DISTWIDTH] IN BLOOD BY AUTOMATED COUNT: 12.9 % (ref 10–15)
EST. AVERAGE GLUCOSE BLD GHB EST-MCNC: 114 MG/DL
HBA1C MFR BLD: 5.6 % (ref 0–5.6)
HCT VFR BLD AUTO: 35 % (ref 35–47)
HGB BLD-MCNC: 11.6 G/DL (ref 11.7–15.7)
MCH RBC QN AUTO: 35.2 PG (ref 26.5–33)
MCHC RBC AUTO-ENTMCNC: 33.1 G/DL (ref 31.5–36.5)
MCV RBC AUTO: 106 FL (ref 78–100)
PLATELET # BLD AUTO: 201 10E3/UL (ref 150–450)
RBC # BLD AUTO: 3.3 10E6/UL (ref 3.8–5.2)
WBC # BLD AUTO: 6.9 10E3/UL (ref 4–11)

## 2025-07-02 PROCEDURE — 80061 LIPID PANEL: CPT | Performed by: FAMILY MEDICINE

## 2025-07-02 PROCEDURE — 80048 BASIC METABOLIC PNL TOTAL CA: CPT | Performed by: FAMILY MEDICINE

## 2025-07-02 PROCEDURE — 3079F DIAST BP 80-89 MM HG: CPT | Performed by: FAMILY MEDICINE

## 2025-07-02 PROCEDURE — 36415 COLL VENOUS BLD VENIPUNCTURE: CPT | Performed by: FAMILY MEDICINE

## 2025-07-02 PROCEDURE — 3077F SYST BP >= 140 MM HG: CPT | Performed by: FAMILY MEDICINE

## 2025-07-02 PROCEDURE — 1125F AMNT PAIN NOTED PAIN PRSNT: CPT | Performed by: FAMILY MEDICINE

## 2025-07-02 PROCEDURE — 83036 HEMOGLOBIN GLYCOSYLATED A1C: CPT | Performed by: FAMILY MEDICINE

## 2025-07-02 PROCEDURE — G0439 PPPS, SUBSEQ VISIT: HCPCS | Performed by: FAMILY MEDICINE

## 2025-07-02 PROCEDURE — 85027 COMPLETE CBC AUTOMATED: CPT | Performed by: FAMILY MEDICINE

## 2025-07-02 PROCEDURE — 73502 X-RAY EXAM HIP UNI 2-3 VIEWS: CPT | Mod: TC | Performed by: RADIOLOGY

## 2025-07-02 PROCEDURE — 99214 OFFICE O/P EST MOD 30 MIN: CPT | Mod: 25 | Performed by: FAMILY MEDICINE

## 2025-07-02 RX ORDER — CARBOXYMETHYLCELLULOSE SODIUM 5 MG/ML
1 SOLUTION/ DROPS OPHTHALMIC 3 TIMES DAILY PRN
COMMUNITY

## 2025-07-02 SDOH — HEALTH STABILITY: PHYSICAL HEALTH: ON AVERAGE, HOW MANY DAYS PER WEEK DO YOU ENGAGE IN MODERATE TO STRENUOUS EXERCISE (LIKE A BRISK WALK)?: 6 DAYS

## 2025-07-02 ASSESSMENT — SOCIAL DETERMINANTS OF HEALTH (SDOH): HOW OFTEN DO YOU GET TOGETHER WITH FRIENDS OR RELATIVES?: ONCE A WEEK

## 2025-07-02 ASSESSMENT — PAIN SCALES - GENERAL: PAINLEVEL_OUTOF10: MODERATE PAIN (5)
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
## 2025-07-02 NOTE — PATIENT INSTRUCTIONS
Check your blood pressure at home 2-3 times weekly.  Send me readings when you have 8-10 of them.    You could try a turmeric supplement for joint pain.    The multivitamin that has the best evidence is Centrum Silver.        Patient Education   Preventive Care Advice   This is general advice given by our system to help you stay healthy. However, your care team may have specific advice just for you. Please talk to your care team about your preventive care needs.  Nutrition  Eat 5 or more servings of fruits and vegetables each day.  Try wheat bread, brown rice and whole grain pasta (instead of white bread, rice, and pasta).  Get enough calcium and vitamin D. Check the label on foods and aim for 100% of the RDA (recommended daily allowance).  Lifestyle  Exercise at least 150 minutes each week  (30 minutes a day, 5 days a week).  Do muscle strengthening activities 2 days a week. These help control your weight and prevent disease.  No smoking.  Wear sunscreen to prevent skin cancer.  Have a dental exam and cleaning every 6 months.  Yearly exams  See your health care team every year to talk about:  Any changes in your health.  Any medicines your care team has prescribed.  Preventive care, family planning, and ways to prevent chronic diseases.  Shots (vaccines)   HPV shots (up to age 26), if you've never had them before.  Hepatitis B shots (up to age 59), if you've never had them before.  COVID-19 shot: Get this shot when it's due.  Flu shot: Get a flu shot every year.  Tetanus shot: Get a tetanus shot every 10 years.  Pneumococcal, hepatitis A, and RSV shots: Ask your care team if you need these based on your risk.  Shingles shot (for age 50 and up)  General health tests  Diabetes screening:  Starting at age 35, Get screened for diabetes at least every 3 years.  If you are younger than age 35, ask your care team if you should be screened for diabetes.  Cholesterol test: At age 39, start having a cholesterol test every 5  years, or more often if advised.  Bone density scan (DEXA): At age 50, ask your care team if you should have this scan for osteoporosis (brittle bones).  Hepatitis C: Get tested at least once in your life.  STIs (sexually transmitted infections)  Before age 24: Ask your care team if you should be screened for STIs.  After age 24: Get screened for STIs if you're at risk. You are at risk for STIs (including HIV) if:  You are sexually active with more than one person.  You don't use condoms every time.  You or a partner was diagnosed with a sexually transmitted infection.  If you are at risk for HIV, ask about PrEP medicine to prevent HIV.  Get tested for HIV at least once in your life, whether you are at risk for HIV or not.  Cancer screening tests  Cervical cancer screening: If you have a cervix, begin getting regular cervical cancer screening tests starting at age 21.  Breast cancer scan (mammogram): If you've ever had breasts, begin having regular mammograms starting at age 40. This is a scan to check for breast cancer.  Colon cancer screening: It is important to start screening for colon cancer at age 45.  Have a colonoscopy test every 10 years (or more often if you're at risk) Or, ask your provider about stool tests like a FIT test every year or Cologuard test every 3 years.  To learn more about your testing options, visit:   .  For help making a decision, visit:   https://bit.ly/oe46605.  Prostate cancer screening test: If you have a prostate, ask your care team if a prostate cancer screening test (PSA) at age 55 is right for you.  Lung cancer screening: If you are a current or former smoker ages 50 to 80, ask your care team if ongoing lung cancer screenings are right for you.  For informational purposes only. Not to replace the advice of your health care provider. Copyright   2023 [employer]. All rights reserved. Clinically reviewed by the [facility]itions Program. [employer] 999008 -  REV 01/24.  Preventing Falls: Care Instructions  Injuries and health problems such as trouble walking or poor eyesight can increase your risk of falling. So can some medicines. But there are things you can do to help prevent falls. You can exercise to get stronger. You can also arrange your home to make it safer.    Talk to your doctor about the medicines you take. Ask if any of them increase the risk of falls and whether they can be changed or stopped.   Try to exercise regularly. It can help improve your strength and balance. This can help lower your risk of falling.         Practice fall safety and prevention.   Wear low-heeled shoes that fit well and give your feet good support. Talk to your doctor if you have foot problems that make this hard.  Carry a cellphone or wear a medical alert device that you can use to call for help.  Use stepladders instead of chairs to reach high objects. Don't climb if you're at risk for falls. Ask for help, if needed.  Wear the correct eyeglasses, if you need them.        Make your home safer.   Remove rugs, cords, clutter, and furniture from walkways.  Keep your house well lit. Use night-lights in hallways and bathrooms.  Install and use sturdy handrails on stairways.  Wear nonskid footwear, even inside. Don't walk barefoot or in socks without shoes.        Be safe outside.   Use handrails, curb cuts, and ramps whenever possible.  Keep your hands free by using a shoulder bag or backpack.  Try to walk in well-lit areas. Watch out for uneven ground, changes in pavement, and debris.  Be careful in the winter. Walk on the grass or gravel when sidewalks are slippery. Use de-icer on steps and walkways. Add non-slip devices to shoes.    Put grab bars and nonskid mats in your shower or tub and near the toilet. Try to use a shower chair or bath bench when bathing.   Get into a tub or shower by putting in your weaker leg first. Get out with your strong side first. Have a phone or medical  "alert device in the bathroom with you.   Where can you learn more?  Go to https://www.[employer].net/patiented  Enter G117 in the search box to learn more about \"Preventing Falls: Care Instructions.\"  Current as of: July 31, 2024  Content Version: 14.5    8637-0044 [employer].   Care instructions adapted under license by your healthcare professional. If you have questions about a medical condition or this instruction, always ask your healthcare professional. [employer] disclaims any warranty or liability for your use of this information.    Learning About Stress  What is stress?     Stress is your body's response to a hard situation. Your body can have a physical, emotional, or mental response. Stress is a fact of life for most people, and it affects everyone differently. What causes stress for you may not be stressful for someone else.  A lot of things can cause stress. You may feel stress when you go on a job interview, take a test, or run a race. This kind of short-term stress is normal and even useful. It can help you if you need to work hard or react quickly. For example, stress can help you finish an important job on time.  Long-term stress is caused by ongoing stressful situations or events. Examples of long-term stress include long-term health problems, ongoing problems at work, or conflicts in your family. Long-term stress can harm your health.  How does stress affect your health?  When you are stressed, your body responds as though you are in danger. It makes hormones that speed up your heart, make you breathe faster, and give you a burst of energy. This is called the fight-or-flight stress response. If the stress is over quickly, your body goes back to normal and no harm is done.  But if stress happens too often or lasts too long, it can have bad effects. Long-term stress can make you more likely to get sick, and it can make symptoms of some diseases worse. If you tense up when you " are stressed, you may develop neck, shoulder, or low back pain. Stress is linked to high blood pressure and heart disease.  Stress also harms your emotional health. It can make you ríos, tense, or depressed. Your relationships may suffer, and you may not do well at work or school.  What can you do to manage stress?  You can try these things to help manage stress:   Do something active. Exercise or activity can help reduce stress. Walking is a great way to get started. Even everyday activities such as housecleaning or yard work can help.  Try yoga or dong chi. These techniques combine exercise and meditation. You may need some training at first to learn them.  Do something you enjoy. For example, listen to music or go to a movie. Practice your hobby or do volunteer work.  Meditate. This can help you relax, because you are not worrying about what happened before or what may happen in the future.  Do guided imagery. Imagine yourself in any setting that helps you feel calm. You can use online videos, books, or a teacher to guide you.  Do breathing exercises. For example:  From a standing position, bend forward from the waist with your knees slightly bent. Let your arms dangle close to the floor.  Breathe in slowly and deeply as you return to a standing position. Roll up slowly and lift your head last.  Hold your breath for just a few seconds in the standing position.  Breathe out slowly and bend forward from the waist.  Let your feelings out. Talk, laugh, cry, and express anger when you need to. Talking with supportive friends or family, a counselor, or a misael leader about your feelings is a healthy way to relieve stress. Avoid discussing your feelings with people who make you feel worse.  Write. It may help to write about things that are bothering you. This helps you find out how much stress you feel and what is causing it. When you know this, you can find better ways to cope.  What can you do to prevent stress?  You  "might try some of these things to help prevent stress:  Manage your time. This helps you find time to do the things you want and need to do.  Get enough sleep. Your body recovers from the stresses of the day while you are sleeping.  Get support. Your family, friends, and community can make a difference in how you experience stress.  Limit your news feed. Avoid or limit time on social media or news that may make you feel stressed.  Do something active. Exercise or activity can help reduce stress. Walking is a great way to get started.  Where can you learn more?  Go to https://www.[employer].net/patiented  Enter N032 in the search box to learn more about \"Learning About Stress.\"  Current as of: October 24, 2024  Content Version: 14.5 2024-2025 [employer].   Care instructions adapted under license by your healthcare professional. If you have questions about a medical condition or this instruction, always ask your healthcare professional. [employer] disclaims any warranty or liability for your use of this information.    Learning About Sleeping Well  What does sleeping well mean?     Sleeping well means getting enough sleep to feel good and stay healthy. How much sleep is enough varies among people.  The number of hours you sleep and how you feel when you wake up are both important. If you do not feel refreshed, you probably need more sleep. Another sign of not getting enough sleep is feeling tired during the day.  Experts recommend that adults get at least 7 or more hours of sleep per day. Children and older adults need more sleep.  Why is getting enough sleep important?  Getting enough quality sleep is a basic part of good health. When your sleep suffers, your physical health, mood, and your thoughts can suffer too. You may find yourself feeling more grumpy or stressed. Not getting enough sleep also can lead to serious problems, including injury, accidents, anxiety, and depression.  What " "might cause poor sleeping?  Many things can cause sleep problems, including:  Changes to your sleep schedule.  Stress. Stress can be caused by fear about a single event, such as giving a speech. Or you may have ongoing stress, such as worry about work or school.  Depression, anxiety, and other mental or emotional conditions.  Changes in your sleep habits or surroundings. This includes changes that happen where you sleep, such as noise, light, or sleeping in a different bed. It also includes changes in your sleep pattern, such as having jet lag or working a late shift.  Health problems, such as pain, breathing problems, and restless legs syndrome.  Lack of regular exercise.  Using alcohol, nicotine, or caffeine before bed.  How can you help yourself?  Here are some tips that may help you sleep more soundly and wake up feeling more refreshed.  Your sleeping area   Use your bedroom only for sleeping and sex. A bit of light reading may help you fall asleep. But if it doesn't, do your reading elsewhere in the house. Try not to use your TV, computer, smartphone, or tablet while you are in bed.  Be sure your bed is big enough to stretch out comfortably, especially if you have a sleep partner.  Keep your bedroom quiet, dark, and cool. Use curtains, blinds, or a sleep mask to block out light. To block out noise, use earplugs, soothing music, or a \"white noise\" machine.  Your evening and bedtime routine   Create a relaxing bedtime routine. You might want to take a warm shower or bath, or listen to soothing music.  Go to bed at the same time every night. And get up at the same time every morning, even if you feel tired.  What to avoid   Limit caffeine (coffee, tea, caffeinated sodas) during the day, and don't have any for at least 6 hours before bedtime.  Avoid drinking alcohol before bedtime. Alcohol can cause you to wake up more often during the night.  Try not to smoke or use tobacco, especially in the evening. Nicotine can " "keep you awake.  Limit naps during the day, especially close to bedtime.  Avoid lying in bed awake for too long. If you can't fall asleep or if you wake up in the middle of the night and can't get back to sleep within about 20 minutes, get out of bed and go to another room until you feel sleepy.  Avoid taking medicine right before bed that may keep you awake or make you feel hyper or energized. Your doctor can tell you if your medicine may do this and if you can take it earlier in the day.  If you can't sleep   Imagine yourself in a peaceful, pleasant scene. Focus on the details and feelings of being in a place that is relaxing.  Get up and do a quiet or boring activity until you feel sleepy.  Avoid drinking any liquids before going to bed to help prevent waking up often to use the bathroom.  Where can you learn more?  Go to https://www.[employer].net/patiented  Enter J942 in the search box to learn more about \"Learning About Sleeping Well.\"  Current as of: July 31, 2024  Content Version: 14.5    9370-9253 [employer].   Care instructions adapted under license by your healthcare professional. If you have questions about a medical condition or this instruction, always ask your healthcare professional. [employer] disclaims any warranty or liability for your use of this information.    Bladder Training: Care Instructions  Your Care Instructions     Bladder training is used to treat urge incontinence and stress incontinence. Urge incontinence means that the need to urinate comes on so fast that you can't get to a toilet in time. Stress incontinence means that you leak urine because of pressure on your bladder. For example, it may happen when you laugh, cough, or lift something heavy.  Bladder training can increase how long you can wait before you have to urinate. It can also help your bladder hold more urine. And it can give you better control over the urge to urinate.  It is important to remember " that bladder training takes a few weeks to a few months to make a difference. You may not see results right away, but don't give up.  Follow-up care is a key part of your treatment and safety. Be sure to make and go to all appointments, and call your doctor if you are having problems. It's also a good idea to know your test results and keep a list of the medicines you take.  How can you care for yourself at home?  Work with your doctor to come up with a bladder training program that is right for you. You may use one or more of the following methods.  Delayed urination  In the beginning, try to keep from urinating for 5 minutes after you first feel the need to go.  While you wait, take deep, slow breaths to relax. Kegel exercises can also help you delay the need to go to the bathroom.  After some practice, when you can easily wait 5 minutes to urinate, try to wait 10 minutes before you urinate.  Slowly increase the waiting period until you are able to control when you have to urinate.  Scheduled urination  Empty your bladder when you first wake up in the morning.  Schedule times throughout the day when you will urinate.  Start by going to the bathroom every hour, even if you don't need to go.  Slowly increase the time between trips to the bathroom.  When you have found a schedule that works well for you, keep doing it.  If you wake up during the night and have to urinate, do it. Apply your schedule to waking hours only.  Kegel exercises  These tighten and strengthen pelvic muscles, which can help you control the flow of urine. (If doing these exercises causes pain, stop doing them and talk with your doctor.) To do Kegel exercises:  Squeeze your muscles as if you were trying not to pass gas. Or squeeze your muscles as if you were stopping the flow of urine. Your belly, legs, and buttocks shouldn't move.  Hold the squeeze for 3 seconds, then relax for 5 to 10 seconds.  Start with 3 seconds, then add 1 second each week  "until you are able to squeeze for 10 seconds.  Repeat the exercise 10 times a session. Do 3 to 8 sessions a day.  When should you call for help?  Watch closely for changes in your health, and be sure to contact your doctor if:    Your incontinence is getting worse.     You do not get better as expected.   Where can you learn more?  Go to https://www.[employer].net/patiented  Enter V684 in the search box to learn more about \"Bladder Training: Care Instructions.\"  Current as of: April 30, 2024  Content Version: 14.5    4516-6549 [employer].   Care instructions adapted under license by your healthcare professional. If you have questions about a medical condition or this instruction, always ask your healthcare professional. [employer] disclaims any warranty or liability for your use of this information.       "

## 2025-07-03 LAB
ANION GAP SERPL CALCULATED.3IONS-SCNC: 12 MMOL/L (ref 7–15)
BUN SERPL-MCNC: 10.9 MG/DL (ref 8–23)
CALCIUM SERPL-MCNC: 9.8 MG/DL (ref 8.8–10.4)
CHLORIDE SERPL-SCNC: 103 MMOL/L (ref 98–107)
CHOLEST SERPL-MCNC: 195 MG/DL
CREAT SERPL-MCNC: 0.81 MG/DL (ref 0.51–0.95)
EGFRCR SERPLBLD CKD-EPI 2021: 76 ML/MIN/1.73M2
FASTING STATUS PATIENT QL REPORTED: NO
FASTING STATUS PATIENT QL REPORTED: NO
GLUCOSE SERPL-MCNC: 118 MG/DL (ref 70–99)
HCO3 SERPL-SCNC: 21 MMOL/L (ref 22–29)
HDLC SERPL-MCNC: 82 MG/DL
LDLC SERPL CALC-MCNC: 80 MG/DL
NONHDLC SERPL-MCNC: 113 MG/DL
POTASSIUM SERPL-SCNC: 4.7 MMOL/L (ref 3.4–5.3)
SODIUM SERPL-SCNC: 136 MMOL/L (ref 135–145)
TRIGL SERPL-MCNC: 166 MG/DL

## 2025-07-05 DIAGNOSIS — I10 ESSENTIAL HYPERTENSION WITH GOAL BLOOD PRESSURE LESS THAN 140/90: ICD-10-CM

## 2025-07-06 ENCOUNTER — RESULTS FOLLOW-UP (OUTPATIENT)
Dept: FAMILY MEDICINE | Facility: CLINIC | Age: 75
End: 2025-07-06
Payer: COMMERCIAL

## 2025-07-06 NOTE — LETTER
July 6, 2025      June Quintero  [address]  [place] [address]        Dear Ms.Leon,    We are writing to inform you of your test results.    Your kidney function is stable.  Cholesterol levels look pretty good also.  Keep trying to eat plenty of vegetables, and minimize fried and fatty foods.  Try switching your multi to Centrum Silver, this might help with your very slightly low hemoglobin.  Your A1c is normal, no sign of diabetes or prediabetes.    Resulted Orders   Basic metabolic panel  (Ca, Cl, CO2, Creat, Gluc, K, Na, BUN)   Result Value Ref Range    Sodium 136 135 - 145 mmol/L    Potassium 4.7 3.4 - 5.3 mmol/L    Chloride 103 98 - 107 mmol/L    Carbon Dioxide (CO2) 21 (L) 22 - 29 mmol/L    Anion Gap 12 7 - 15 mmol/L    Urea Nitrogen 10.9 8.0 - 23.0 mg/dL    Creatinine 0.81 0.51 - 0.95 mg/dL    GFR Estimate 76 >60 mL/min/1.73m2      Comment:      eGFR calculated using 2021 CKD-EPI equation.    Calcium 9.8 8.8 - 10.4 mg/dL    Glucose 118 (H) 70 - 99 mg/dL    Patient Fasting > 8hrs? No    Lipid panel reflex to direct LDL Non-fasting   Result Value Ref Range    Cholesterol 195 <200 mg/dL    Triglycerides 166 (H) <150 mg/dL    Direct Measure HDL 82 >=50 mg/dL    LDL Cholesterol Calculated 80 <100 mg/dL      Comment:      LDL calculated using the Friedewald equation.    Non HDL Cholesterol 113 <130 mg/dL    Patient Fasting > 8hrs? No     Narrative    Cholesterol  Desirable: < 200 mg/dL  Borderline High: 200 - 239 mg/dL  High: >= 240 mg/dL    Triglycerides  Normal: < 150 mg/dL  Borderline High: 150 - 199 mg/dL  High: 200-499 mg/dL  Very High: >= 500 mg/dL    Direct Measure HDL  Female: >= 50 mg/dL   Male: >= 40 mg/dL    LDL Cholesterol  Desirable: < 100 mg/dL  Above Desirable: 100 - 129 mg/dL   Borderline High: 130 - 159 mg/dL   High:  160 - 189 mg/dL   Very High: >= 190 mg/dL    Non HDL Cholesterol  Desirable: < 130 mg/dL  Above Desirable: 130 - 159 mg/dL  Borderline High: 160 - 189  mg/dL  High: 190 - 219 mg/dL  Very High: >= 220 mg/dL   CBC with platelets   Result Value Ref Range    WBC Count 6.9 4.0 - 11.0 10e3/uL    RBC Count 3.30 (L) 3.80 - 5.20 10e6/uL    Hemoglobin 11.6 (L) 11.7 - 15.7 g/dL    Hematocrit 35.0 35.0 - 47.0 %     (H) 78 - 100 fL    MCH 35.2 (H) 26.5 - 33.0 pg    MCHC 33.1 31.5 - 36.5 g/dL    RDW 12.9 10.0 - 15.0 %    Platelet Count 201 150 - 450 10e3/uL    Narrative    Results consistent with previous, repeat testing unnecessary     Hemoglobin A1c   Result Value Ref Range    Estimated Average Glucose 114 <117 mg/dL    Hemoglobin A1C 5.6 0.0 - 5.6 %      Comment:      Normal <5.7%   Prediabetes 5.7-6.4%    Diabetes 6.5% or higher     Note: Adopted from ADA consensus guidelines.       If you have any questions or concerns, please call the clinic at the number listed above.       Sincerely,      Jennifer Hankins MD    Electronically signed

## 2025-07-07 RX ORDER — LOSARTAN POTASSIUM 25 MG/1
25 TABLET ORAL DAILY
Qty: 100 TABLET | Refills: 3 | Status: SHIPPED | OUTPATIENT
Start: 2025-07-07

## 2025-07-08 DIAGNOSIS — M25.551 HIP PAIN, RIGHT: Primary | ICD-10-CM

## 2025-07-08 NOTE — TELEPHONE ENCOUNTER
Let patient know that her hip arthritis has progressed and that Dr. Hankins recommends discussing with Orthopedics.  Patient would like to go to [facility] in [place] as suggested and would like a referral.  Instructed June to call her insurance to verify that this location is covered under her plan.    Additionally June is requesting a referral to a different podiatrist.  She has been going to Dr. Naseem South on [address] but would like to switch to another podiatrist.  She doesn't have a particular location or provider in mind but would like it to be somewhere near the [facility].

## 2025-07-09 ENCOUNTER — PATIENT OUTREACH (OUTPATIENT)
Dept: CARE COORDINATION | Facility: CLINIC | Age: 75
End: 2025-07-09
Payer: COMMERCIAL

## 2025-07-09 NOTE — TELEPHONE ENCOUNTER
RN called June on 7/9 and left a message to return call to clinic.      TC if patient returns call please relay provider notes and recommendations. Please route to nurse queue if there are any further questions or concerns.     Referral was printed and faxed to [phone].    Mo Garnett RN  [facility]      I placed a referral to [facility] for her hip. She could ask while she is at [facility] to make an appointment with a podiatrist. Otherwise if she would like a referral to [facility], she would need to tell me in more detail what the referral is for. We did not discuss podiatry referral when I saw her. -Dr. Hankins

## 2025-07-14 ENCOUNTER — TRANSFERRED RECORDS (OUTPATIENT)
Dept: HEALTH INFORMATION MANAGEMENT | Facility: CLINIC | Age: 75
End: 2025-07-14
Payer: COMMERCIAL

## 2025-07-17 ENCOUNTER — THERAPY VISIT (OUTPATIENT)
Dept: PHYSICAL THERAPY | Facility: REHABILITATION | Age: 75
End: 2025-07-17
Attending: FAMILY MEDICINE
Payer: COMMERCIAL

## 2025-07-17 DIAGNOSIS — M25.551 HIP PAIN, RIGHT: ICD-10-CM

## 2025-07-17 DIAGNOSIS — M79.661 PAIN OF RIGHT LOWER LEG: ICD-10-CM

## 2025-07-17 ASSESSMENT — ACTIVITIES OF DAILY LIVING (ADL)
ADL_TOTAL_ITEM_SCORE: 0
ABILITY_TO_PARTICIPATE_IN_YOUR_DESIRED_SPORT_AS_LONG_AS_YOU_WOULD_LIKE: EXTREME DIFFICULTY
TWISTING/PIVOTING ON INVOLVED LEG: MODERATE DIFFICULTY
SPORTS_SCORE(%): 0
STANDING_FOR_15_MINUTES: SLIGHT DIFFICULTY
GETTING INTO AND OUT OF AN AVERAGE CAR: SLIGHT DIFFICULTY
GETTING_INTO_AND_OUT_OF_A_BATHTUB: MODERATE DIFFICULTY
ROLLING_OVER_IN_BED: MODERATE DIFFICULTY
HEAVY_WORK: MODERATE DIFFICULTY
SPORTS_HIGHEST_POTENTIAL_SCORE: 36
HOW_WOULD_YOU_RATE_YOUR_CURRENT_LEVEL_OF_FUNCTION_DURING_YOUR_USUAL_ACTIVITIES_OF_DAILY_LIVING_FROM_0_TO_100_WITH_100_BEING_YOUR_LEVEL_OF_FUNCTION_PRIOR_TO_YOUR_HIP_PROBLEM_AND_0_BEING_THE_INABILITY_TO_PERFORM_ANY_OF_YOUR_USUAL_DAILY_ACTIVITIES?: 100
CUTTING/LATERAL_MOVEMENTS: MODERATE DIFFICULTY
JUMPING: EXTREME DIFFICULTY
WALKING_FOR_APPROXIMATELY_10_MINUTES: NO DIFFICULTY AT ALL
SPORTS_TOTAL_ITEM_SCORE: 0
LIGHT_TO_MODERATE_WORK: MODERATE DIFFICULTY
HOW_WOULD_YOU_RATE_YOUR_CURRENT_LEVEL_OF_FUNCTION_DURING_YOUR_USUAL_ACTIVITIES_OF_DAILY_LIVING_FROM_0_TO_100_WITH_100_BEING_YOUR_LEVEL_OF_FUNCTION_PRIOR_TO_YOUR_HIP_PROBLEM_AND_0_BEING_THE_INABILITY_TO_PERFORM_ANY_OF_YOUR_USUAL_DAILY_ACTIVITIES?: 100
DEEP SQUATTING: EXTREME DIFFICULTY
PLEASE_INDICATE_YOR_PRIMARY_REASON_FOR_REFERRAL_TO_THERAPY:: HIP
TWISTING/PIVOTING_ON_INVOLVED_LEG: MODERATE DIFFICULTY
LOW_IMPACT_ACTIVITIES_LIKE_FAST_WALKING: MODERATE DIFFICULTY
LANDING: MODERATE DIFFICULTY
WALKING_APPROXIMATELY_10_MINUTES: NO DIFFICULTY AT ALL
STEPPING_UP_AND_DOWN_CURBS: MODERATE DIFFICULTY
ADL_SCORE(%): 0
WALKING_15_MINUTES_OR_GREATER: MODERATE DIFFICULTY
GOING_UP_1_FLIGHT_OF_STAIRS: MODERATE DIFFICULTY
SWINGING_OBJECTS_LIKE_A_GOLF_CLUB: NO DIFFICULTY AT ALL
HOS_ADL_ITEM_SCORE_TOTAL: 40
GETTING_INTO_AND_OUT_OF_AN_AVERAGE_CAR: SLIGHT DIFFICULTY
HEAVY_WORK: MODERATE DIFFICULTY
SITTING FOR 15 MINUTES: NO DIFFICULTY AT ALL
GOING DOWN 1 FLIGHT OF STAIRS: EXTREME DIFFICULTY
PUTTING_ON_SOCKS_AND_SHOES: MODERATE DIFFICULTY
SPORTS_COUNT: 9
PUTTING ON SOCKS AND SHOES: MODERATE DIFFICULTY
HOW_WOULD_YOU_RATE_YOUR_CURRENT_LEVEL_OF_FUNCTION?: ABNORMAL
WALKING_DOWN_STEEP_HILLS: NO DIFFICULTY AT ALL
GOING_DOWN_1_FLIGHT_OF_STAIRS: EXTREME DIFFICULTY
HOS_ADL_SCORE(%): 58.82
RECREATIONAL_ACTIVITIES: MODERATE DIFFICULTY
WALKING_INITIALLY: SLIGHT DIFFICULTY
GETTING_INTO_AND_OUT_OF_A_BATHTUB: MODERATE DIFFICULTY
WALKING_UP_STEEP_HILLS: SLIGHT DIFFICULTY
SITTING_FOR_15_MINUTES: NO DIFFICULTY AT ALL
WALKING_DOWN_STEEP_HILLS: NO DIFFICULTY AT ALL
ADL_COUNT: 17
RECREATIONAL ACTIVITIES: MODERATE DIFFICULTY
WALKING_15_MINUTES_OR_GREATER: MODERATE DIFFICULTY
WALKING_UP_STEEP_HILLS: SLIGHT DIFFICULTY
HOW_WOULD_YOU_RATE_YOUR_CURRENT_LEVEL_OF_FUNCTION_DURING_YOUR_SPORTS_RELATED_ACTIVITIES_FROM_0_TO_100_WITH_100_BEING_YOUR_LEVEL_OF_FUNCTION_PRIOR_TO_YOUR_HIP_PROBLEM_AND_0_BEING_THE_INABILITY_TO_PERFORM_ANY_OF_YOUR_USUAL_DAILY_ACTIVITIES?: 100
STARTING_AND_STOPPING_QUICKLY: NO DIFFICULTY AT ALL
LIGHT_TO_MODERATE_WORK: MODERATE DIFFICULTY
GOING UP 1 FLIGHT OF STAIRS: MODERATE DIFFICULTY
DEEP_SQUATTING: EXTREME DIFFICULTY
WALKING_INITIALLY: SLIGHT DIFFICULTY
ABILITY_TO_PERFORM_ACTIVITY_WITH_YOUR_NORMAL_TECHNIQUE: NO DIFFICULTY AT ALL
RUNNING_ONE_MILE: EXTREME DIFFICULTY
STEPPING UP AND DOWN CURBS: MODERATE DIFFICULTY
STANDING FOR 15 MINUTES: SLIGHT DIFFICULTY
ROLLING OVER IN BED: MODERATE DIFFICULTY
HOS_ADL_HIGHEST_POTENTIAL_SCORE: 68
ADL_HIGHEST_POTENTIAL_SCORE: 68

## 2025-08-05 DIAGNOSIS — Z91.89 HIGH RISK FOR HIP FRACTURE: ICD-10-CM

## 2025-08-05 DIAGNOSIS — M85.80 OSTEOPENIA, UNSPECIFIED LOCATION: ICD-10-CM

## 2025-08-07 RX ORDER — ALENDRONATE SODIUM 70 MG/1
TABLET ORAL
Qty: 12 TABLET | Refills: 2 | Status: SHIPPED | OUTPATIENT
Start: 2025-08-07

## 2025-08-17 DIAGNOSIS — E78.00 HYPERCHOLESTEREMIA: ICD-10-CM

## 2025-08-18 RX ORDER — SIMVASTATIN 10 MG
10 TABLET ORAL AT BEDTIME
Qty: 90 TABLET | Refills: 2 | Status: SHIPPED | OUTPATIENT
Start: 2025-08-18

## 2025-09-04 ENCOUNTER — THERAPY VISIT (OUTPATIENT)
Dept: PHYSICAL THERAPY | Facility: REHABILITATION | Age: 75
End: 2025-09-04
Payer: COMMERCIAL

## 2025-09-04 DIAGNOSIS — M25.551 HIP PAIN, RIGHT: ICD-10-CM

## 2025-09-04 DIAGNOSIS — M79.661 PAIN OF RIGHT LOWER LEG: Primary | ICD-10-CM
